# Patient Record
Sex: MALE | Race: WHITE | NOT HISPANIC OR LATINO | Employment: FULL TIME | URBAN - METROPOLITAN AREA
[De-identification: names, ages, dates, MRNs, and addresses within clinical notes are randomized per-mention and may not be internally consistent; named-entity substitution may affect disease eponyms.]

---

## 2019-05-08 ENCOUNTER — TELEPHONE (OUTPATIENT)
Dept: INTERNAL MEDICINE | Facility: CLINIC | Age: 44
End: 2019-05-08

## 2019-05-08 DIAGNOSIS — I10 ESSENTIAL HYPERTENSION, BENIGN: ICD-10-CM

## 2019-05-08 DIAGNOSIS — E78.2 MIXED HYPERLIPIDEMIA: Primary | ICD-10-CM

## 2019-05-08 DIAGNOSIS — Z72.53 HIGH RISK BISEXUAL BEHAVIOR: ICD-10-CM

## 2019-05-08 DIAGNOSIS — Z00.00 HEALTHCARE MAINTENANCE: ICD-10-CM

## 2019-05-08 RX ORDER — LISINOPRIL 10 MG/1
TABLET ORAL
Qty: 90 TABLET | Refills: 1 | Status: CANCELLED | OUTPATIENT
Start: 2019-05-08

## 2019-05-08 RX ORDER — ATORVASTATIN CALCIUM 20 MG/1
TABLET, FILM COATED ORAL
Qty: 90 TABLET | Refills: 1 | Status: CANCELLED | OUTPATIENT
Start: 2019-05-08

## 2019-05-13 RX ORDER — ATORVASTATIN CALCIUM 20 MG/1
20 TABLET, FILM COATED ORAL DAILY
Qty: 90 TABLET | Refills: 0 | Status: SHIPPED | OUTPATIENT
Start: 2019-05-13 | End: 2019-06-10 | Stop reason: SDUPTHER

## 2019-05-13 RX ORDER — LISINOPRIL 10 MG/1
10 TABLET ORAL DAILY
Qty: 90 TABLET | Refills: 0 | Status: SHIPPED | OUTPATIENT
Start: 2019-05-13 | End: 2019-06-10 | Stop reason: SDUPTHER

## 2019-06-10 ENCOUNTER — OFFICE VISIT (OUTPATIENT)
Dept: INTERNAL MEDICINE | Facility: CLINIC | Age: 44
End: 2019-06-10

## 2019-06-10 VITALS
BODY MASS INDEX: 31.75 KG/M2 | HEIGHT: 72 IN | RESPIRATION RATE: 20 BRPM | OXYGEN SATURATION: 98 % | TEMPERATURE: 99.1 F | DIASTOLIC BLOOD PRESSURE: 78 MMHG | SYSTOLIC BLOOD PRESSURE: 124 MMHG | WEIGHT: 234.4 LBS | HEART RATE: 88 BPM

## 2019-06-10 DIAGNOSIS — I10 ESSENTIAL HYPERTENSION: Primary | ICD-10-CM

## 2019-06-10 DIAGNOSIS — J45.909 ASTHMA, UNSPECIFIED ASTHMA SEVERITY, UNSPECIFIED WHETHER COMPLICATED, UNSPECIFIED WHETHER PERSISTENT: ICD-10-CM

## 2019-06-10 DIAGNOSIS — E78.5 HYPERLIPIDEMIA, UNSPECIFIED HYPERLIPIDEMIA TYPE: ICD-10-CM

## 2019-06-10 DIAGNOSIS — E11.9 TYPE 2 DIABETES MELLITUS WITHOUT COMPLICATION, WITHOUT LONG-TERM CURRENT USE OF INSULIN (HCC): ICD-10-CM

## 2019-06-10 LAB
ALBUMIN SERPL-MCNC: 4.7 G/DL (ref 3.5–5.2)
ALBUMIN/GLOB SERPL: 2 G/DL
ALP SERPL-CCNC: 60 U/L (ref 39–117)
ALT SERPL-CCNC: 41 U/L (ref 1–41)
AST SERPL-CCNC: 20 U/L (ref 1–40)
BILIRUB SERPL-MCNC: 0.7 MG/DL (ref 0.2–1.2)
BUN SERPL-MCNC: 12 MG/DL (ref 6–20)
BUN/CREAT SERPL: 13.6 (ref 7–25)
CALCIUM SERPL-MCNC: 9.7 MG/DL (ref 8.6–10.5)
CHLORIDE SERPL-SCNC: 102 MMOL/L (ref 98–107)
CHOLEST SERPL-MCNC: 132 MG/DL (ref 0–200)
CO2 SERPL-SCNC: 26.5 MMOL/L (ref 22–29)
CREAT SERPL-MCNC: 0.88 MG/DL (ref 0.76–1.27)
GLOBULIN SER CALC-MCNC: 2.3 GM/DL
GLUCOSE SERPL-MCNC: 177 MG/DL (ref 65–99)
HDLC SERPL-MCNC: 39 MG/DL (ref 40–60)
LDLC SERPL CALC-MCNC: 56 MG/DL (ref 0–100)
POTASSIUM SERPL-SCNC: 4.4 MMOL/L (ref 3.5–5.2)
PROT SERPL-MCNC: 7 G/DL (ref 6–8.5)
SODIUM SERPL-SCNC: 142 MMOL/L (ref 136–145)
TRIGL SERPL-MCNC: 184 MG/DL (ref 0–150)
VLDLC SERPL CALC-MCNC: 36.8 MG/DL

## 2019-06-10 PROCEDURE — 90715 TDAP VACCINE 7 YRS/> IM: CPT | Performed by: INTERNAL MEDICINE

## 2019-06-10 PROCEDURE — 90471 IMMUNIZATION ADMIN: CPT | Performed by: INTERNAL MEDICINE

## 2019-06-10 PROCEDURE — 99214 OFFICE O/P EST MOD 30 MIN: CPT | Performed by: INTERNAL MEDICINE

## 2019-06-10 RX ORDER — ALBUTEROL SULFATE 90 UG/1
2 AEROSOL, METERED RESPIRATORY (INHALATION) EVERY 4 HOURS PRN
COMMUNITY
End: 2019-06-10 | Stop reason: SDUPTHER

## 2019-06-10 RX ORDER — LISINOPRIL 10 MG/1
10 TABLET ORAL DAILY
Qty: 90 TABLET | Refills: 3 | Status: SHIPPED | OUTPATIENT
Start: 2019-06-10 | End: 2020-07-31

## 2019-06-10 RX ORDER — ATORVASTATIN CALCIUM 20 MG/1
20 TABLET, FILM COATED ORAL DAILY
Qty: 90 TABLET | Refills: 3 | Status: SHIPPED | OUTPATIENT
Start: 2019-06-10 | End: 2020-07-31

## 2019-06-10 RX ORDER — BUDESONIDE AND FORMOTEROL FUMARATE DIHYDRATE 80; 4.5 UG/1; UG/1
2 AEROSOL RESPIRATORY (INHALATION)
Qty: 3 INHALER | Refills: 3 | Status: SHIPPED | OUTPATIENT
Start: 2019-06-10 | End: 2020-09-21

## 2019-06-10 RX ORDER — DIAZEPAM 5 MG/1
5 TABLET ORAL 2 TIMES DAILY PRN
COMMUNITY
End: 2019-09-03 | Stop reason: SDUPTHER

## 2019-06-10 RX ORDER — ALBUTEROL SULFATE 90 UG/1
2 AEROSOL, METERED RESPIRATORY (INHALATION) EVERY 4 HOURS PRN
Qty: 3 INHALER | Refills: 6 | Status: SHIPPED | OUTPATIENT
Start: 2019-06-10 | End: 2020-06-22

## 2019-06-10 RX ORDER — TEMAZEPAM 15 MG/1
15 CAPSULE ORAL NIGHTLY PRN
COMMUNITY
End: 2019-09-03 | Stop reason: SDUPTHER

## 2019-06-10 RX ORDER — BUDESONIDE AND FORMOTEROL FUMARATE DIHYDRATE 80; 4.5 UG/1; UG/1
2 AEROSOL RESPIRATORY (INHALATION)
COMMUNITY
End: 2019-06-10 | Stop reason: SDUPTHER

## 2019-06-10 NOTE — PROGRESS NOTES
Subjective        Chief Complaint   Patient presents with   • Follow-up     6 month fup med eval refiulls   • Hypertension   • Med Refill   • Diabetes     PHQ-2 Depression Screening  Little interest or pleasure in doing things? 0   Feeling down, depressed, or hopeless? 0   PHQ-2 Total Score 0           Kaushal Jacobs is a 43 y.o. male who presents for    Patient Active Problem List   Diagnosis   • Hypertension   • Hyperlipidemia   • Asthma   • Diabetes mellitus (CMS/[facility])       History of Present Illness   He has not been checking his BP or sugars. He is not exercising. 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 He denies chest pain or dyspnea. He will wheeze a few times per week. He does not use his Symbicort regularly. He will use it only if his breathing is bad. He uses his rescue inhaler 2-3 times per week. He takes Temazepam infrequently for sleep.  Allergies   Allergen Reactions   • Penicillins        Current Outpatient Medications on File Prior to Visit   Medication Sig Dispense Refill   • diazePAM (VALIUM) 5 MG tablet Take 5 mg by mouth 2 (Two) Times a Day As Needed for Anxiety. AS NEEDED PRIOR TO FLIGHT     • temazepam (RESTORIL) 15 MG capsule Take 15 mg by mouth At Night As Needed for Sleep.     • [DISCONTINUED] albuterol sulfate  (90 Base) MCG/ACT inhaler Inhale 2 puffs Every 4 (Four) Hours As Needed for Wheezing.     • [DISCONTINUED] aspirin 81 MG tablet Take 1 tablet by mouth Daily. 30 tablet 0   • [DISCONTINUED] atorvastatin (LIPITOR) 20 MG tablet Take 1 tablet by mouth Daily. 90 tablet 0   • [DISCONTINUED] budesonide-formoterol (SYMBICORT) 80-4.5 MCG/ACT inhaler Inhale 2 puffs 2 (Two) Times a Day.     • [DISCONTINUED] lisinopril (PRINIVIL,ZESTRIL) 10 MG tablet Take 1 tablet by mouth Daily. 90 tablet 0     No current facility-administered medications on file prior to visit.        Past Medical History:   Diagnosis Date   • Asthma    • Carpal tunnel syndrome    • Diabetes mellitus (CMS/HCC)    • GERD  "(gastroesophageal reflux disease)    • Hyperlipidemia    • Hypertension    • Insomnia        Past Surgical History:   Procedure Laterality Date   • ENDOSCOPY     • HERNIA REPAIR     • KNEE SURGERY      ACL   • TONSILLECTOMY         Family History   Problem Relation Age of Onset   • Hyperlipidemia Mother    • Hypertension Mother    • Diabetes Mother    • Alzheimer's disease Mother    • COPD Father    • Heart disease Father    • Hyperlipidemia Father    • Hypertension Father        Social History     Socioeconomic History   • Marital status: Single     Spouse name: Not on file   • Number of children: Not on file   • Years of education: Not on file   • Highest education level: Not on file   Tobacco Use   • Smoking status: Never Smoker   • Smokeless tobacco: Never Used   Substance and Sexual Activity   • Alcohol use: Yes     Frequency: 4 or more times a week     Drinks per session: 1 or 2     Comment: 2 drinks daily   • Drug use: No   • Sexual activity: Yes     Partners: Male     Birth control/protection: Condom     Comment: he is sexually active with condoms but he does not have a stable partner           The following portions of the patient's history were reviewed and updated as appropriate: problem list, allergies, current medications, past medical history, past family history, past social history and past surgical history.    Review of Systems   Respiratory: Positive for wheezing. Negative for shortness of breath.    Cardiovascular: Negative for chest pain.       Immunization History   Administered Date(s) Administered   • Flu Vaccine Quad PF >18YRS 09/16/2016, 11/20/2018   • Hepatitis A 01/01/2011   • Hepatitis B 01/01/2011   • Tdap 01/01/2009   • Typhoid, Unspecified 01/01/2016       Objective   Vitals:    06/10/19 0841   BP: 124/78   Pulse: 88   Resp: 20   Temp: 99.1 °F (37.3 °C)   TempSrc: Oral   SpO2: 98%   Weight: 106 kg (234 lb 6.4 oz)   Height: 182.9 cm (72\")     Physical Exam   Constitutional: He appears " well-developed and well-nourished.   HENT:   Head: Normocephalic and atraumatic.   Cardiovascular: Normal rate, regular rhythm, S1 normal, S2 normal and normal heart sounds.   Pulmonary/Chest: Effort normal and breath sounds normal.   Neurological: He is alert.   Skin: Skin is warm.   Psychiatric: He has a normal mood and affect.   Vitals reviewed.      Procedures    Assessment/Plan   Kaushal was seen today for follow-up, hypertension, med refill and diabetes.    Diagnoses and all orders for this visit:    Essential hypertension  Comments:  BP is good    Hyperlipidemia, unspecified hyperlipidemia type  Comments:  Check LDL today    Asthma, unspecified asthma severity, unspecified whether complicated, unspecified whether persistent  Comments:  Discussed taking Symbicort regularly    Type 2 diabetes mellitus without complication, without long-term current use of insulin (CMS/[facility])  -     Hemoglobin A1c  -     Microalbumin / Creatinine Urine Ratio - Urine, Clean Catch  -     Comprehensive Metabolic Panel; Future  -     Hemoglobin A1c; Future    Other orders  -     Tdap Vaccine Greater Than or Equal To 8yo IM             Labs today including HIV that was already ordered. Tdap today. Rec use Symbicort twice per day. BP is good.Check a1c today. Encouraged exercise. Ran [employer] and signed controlled substance agreement for Temazepam.     Return in about 3 months (around 9/10/2019) for Annual physical.

## 2019-06-11 DIAGNOSIS — Z79.4 TYPE 2 DIABETES MELLITUS WITHOUT COMPLICATION, WITH LONG-TERM CURRENT USE OF INSULIN (HCC): Primary | ICD-10-CM

## 2019-06-11 DIAGNOSIS — E11.9 TYPE 2 DIABETES MELLITUS WITHOUT COMPLICATION, WITH LONG-TERM CURRENT USE OF INSULIN (HCC): Primary | ICD-10-CM

## 2019-06-11 LAB
ALBUMIN/CREAT UR: 7.6 MG/G CREAT (ref 0–30)
CREAT UR-MCNC: 330.9 MG/DL
HBA1C MFR BLD: 7.4 % (ref 4.8–5.6)
HIV 1+2 AB+HIV1 P24 AG SERPL QL IA: NON REACTIVE
MICROALBUMIN UR-MCNC: 25.1 UG/ML
RPR SER QL: NORMAL

## 2019-07-03 ENCOUNTER — HOSPITAL ENCOUNTER (OUTPATIENT)
Dept: DIABETES SERVICES | Facility: HOSPITAL | Age: 44
Setting detail: RECURRING SERIES
Discharge: HOME | End: 2019-07-03

## 2019-07-03 PROCEDURE — G0109 DIAB MANAGE TRN IND/GROUP: HCPCS

## 2019-07-10 ENCOUNTER — HOSPITAL ENCOUNTER (OUTPATIENT)
Dept: DIABETES SERVICES | Facility: HOSPITAL | Age: 44
Setting detail: RECURRING SERIES
Discharge: HOME | End: 2019-07-10

## 2019-07-10 PROCEDURE — G0109 DIAB MANAGE TRN IND/GROUP: HCPCS

## 2019-07-17 ENCOUNTER — HOSPITAL ENCOUNTER (OUTPATIENT)
Dept: DIABETES SERVICES | Facility: HOSPITAL | Age: 44
Setting detail: RECURRING SERIES
Discharge: HOME | End: 2019-07-17

## 2019-07-17 PROCEDURE — G0109 DIAB MANAGE TRN IND/GROUP: HCPCS | Performed by: DIETITIAN, REGISTERED

## 2019-09-03 ENCOUNTER — RESULTS ENCOUNTER (OUTPATIENT)
Dept: INTERNAL MEDICINE | Facility: CLINIC | Age: 44
End: 2019-09-03

## 2019-09-03 DIAGNOSIS — E11.9 TYPE 2 DIABETES MELLITUS WITHOUT COMPLICATION, WITHOUT LONG-TERM CURRENT USE OF INSULIN (HCC): ICD-10-CM

## 2019-09-03 RX ORDER — DIAZEPAM 5 MG/1
5 TABLET ORAL 2 TIMES DAILY PRN
Qty: 6 TABLET | Refills: 0 | Status: SHIPPED | OUTPATIENT
Start: 2019-09-03 | End: 2021-09-15

## 2019-09-03 RX ORDER — TEMAZEPAM 15 MG/1
15 CAPSULE ORAL NIGHTLY PRN
Qty: 15 CAPSULE | Refills: 0 | Status: SHIPPED | OUTPATIENT
Start: 2019-09-03 | End: 2021-07-16

## 2019-09-11 LAB
ALBUMIN SERPL-MCNC: 4.7 G/DL (ref 3.5–5.2)
ALBUMIN/GLOB SERPL: 2 G/DL
ALP SERPL-CCNC: 47 U/L (ref 39–117)
ALT SERPL-CCNC: 25 U/L (ref 1–41)
AST SERPL-CCNC: 19 U/L (ref 1–40)
BILIRUB SERPL-MCNC: 0.5 MG/DL (ref 0.2–1.2)
BUN SERPL-MCNC: 12 MG/DL (ref 6–20)
BUN/CREAT SERPL: 14.5 (ref 7–25)
CALCIUM SERPL-MCNC: 9.4 MG/DL (ref 8.6–10.5)
CHLORIDE SERPL-SCNC: 100 MMOL/L (ref 98–107)
CO2 SERPL-SCNC: 24.1 MMOL/L (ref 22–29)
CREAT SERPL-MCNC: 0.83 MG/DL (ref 0.76–1.27)
GLOBULIN SER CALC-MCNC: 2.3 GM/DL
GLUCOSE SERPL-MCNC: 124 MG/DL (ref 65–99)
HBA1C MFR BLD: 6 % (ref 4.8–5.6)
POTASSIUM SERPL-SCNC: 4.8 MMOL/L (ref 3.5–5.2)
PROT SERPL-MCNC: 7 G/DL (ref 6–8.5)
SODIUM SERPL-SCNC: 140 MMOL/L (ref 136–145)

## 2019-09-24 ENCOUNTER — OFFICE VISIT (OUTPATIENT)
Dept: INTERNAL MEDICINE | Facility: CLINIC | Age: 44
End: 2019-09-24

## 2019-09-24 VITALS
SYSTOLIC BLOOD PRESSURE: 124 MMHG | WEIGHT: 218.4 LBS | TEMPERATURE: 98.6 F | HEIGHT: 72 IN | DIASTOLIC BLOOD PRESSURE: 76 MMHG | BODY MASS INDEX: 29.58 KG/M2 | HEART RATE: 93 BPM | OXYGEN SATURATION: 94 %

## 2019-09-24 DIAGNOSIS — J45.909 ASTHMA, UNSPECIFIED ASTHMA SEVERITY, UNSPECIFIED WHETHER COMPLICATED, UNSPECIFIED WHETHER PERSISTENT: ICD-10-CM

## 2019-09-24 DIAGNOSIS — I10 ESSENTIAL HYPERTENSION: Primary | ICD-10-CM

## 2019-09-24 DIAGNOSIS — E11.9 TYPE 2 DIABETES MELLITUS WITHOUT COMPLICATION, WITHOUT LONG-TERM CURRENT USE OF INSULIN (HCC): ICD-10-CM

## 2019-09-24 DIAGNOSIS — Z00.00 WELLNESS EXAMINATION: ICD-10-CM

## 2019-09-24 DIAGNOSIS — J06.9 ACUTE URI: ICD-10-CM

## 2019-09-24 PROCEDURE — 99214 OFFICE O/P EST MOD 30 MIN: CPT | Performed by: INTERNAL MEDICINE

## 2019-09-24 RX ORDER — METHYLPREDNISOLONE 4 MG/1
TABLET ORAL
Qty: 21 EACH | Refills: 0 | Status: SHIPPED | OUTPATIENT
Start: 2019-09-24 | End: 2020-03-24

## 2019-09-24 RX ORDER — AZITHROMYCIN 250 MG/1
TABLET, FILM COATED ORAL
Qty: 6 TABLET | Refills: 0 | Status: SHIPPED | OUTPATIENT
Start: 2019-09-24 | End: 2020-03-24

## 2019-09-24 NOTE — PROGRESS NOTES
Subjective        Chief Complaint   Patient presents with   • Follow-up     3 month fup review labs    • Hypertension   • Hyperlipidemia   • Diabetes           Kaushal Jacobs is a 43 y.o. male who presents for    Patient Active Problem List   Diagnosis   • Hypertension   • Hyperlipidemia   • Asthma   • Diabetes mellitus (CMS/[facility])       History of Present Illness     He denies chest pain or dyspnea. He has a runny nose with congestion and watery eyes. He has been coughing up yellow to clear since Friday.  He only uses Valium and Temazepam for traveling. He does not check his BP or sugars. He does not exercise. He does not use Symbicort regularly; before he was sick it had been weeks since he used Albuterol. He called his ortho for pain in his left foot and he has been using Ibuprofen. He has had some low back pain after getting a massage. It is getting better. He has lost weight with low carb diet.  Allergies   Allergen Reactions   • Penicillins        Current Outpatient Medications on File Prior to Visit   Medication Sig Dispense Refill   • albuterol sulfate  (90 Base) MCG/ACT inhaler Inhale 2 puffs Every 4 (Four) Hours As Needed for Wheezing. 3 inhaler 6   • atorvastatin (LIPITOR) 20 MG tablet Take 1 tablet by mouth Daily. 90 tablet 3   • budesonide-formoterol (SYMBICORT) 80-4.5 MCG/ACT inhaler Inhale 2 puffs 2 (Two) Times a Day. 3 inhaler 3   • lisinopril (PRINIVIL,ZESTRIL) 10 MG tablet Take 1 tablet by mouth Daily. 90 tablet 3   • metFORMIN (GLUCOPHAGE) 500 MG tablet Take 1 tablet by mouth 2 (Two) Times a Day With Meals. 60 tablet 4   • diazePAM (VALIUM) 5 MG tablet Take 1 tablet by mouth 2 (Two) Times a Day As Needed for Anxiety. AS NEEDED PRIOR TO FLIGHT 6 tablet 0   • temazepam (RESTORIL) 15 MG capsule Take 1 capsule by mouth At Night As Needed for Sleep. 15 capsule 0     No current facility-administered medications on file prior to visit.        Past Medical History:   Diagnosis Date   • Asthma    •  Carpal tunnel syndrome    • Diabetes mellitus (CMS/HCC)    • GERD (gastroesophageal reflux disease)    • Hyperlipidemia    • Hypertension    • Insomnia        Past Surgical History:   Procedure Laterality Date   • ENDOSCOPY     • KNEE SURGERY Right 2000    ACL   • TONSILLECTOMY     • UMBILICAL HERNIA REPAIR         Family History   Problem Relation Age of Onset   • Hyperlipidemia Mother    • Hypertension Mother    • Diabetes Mother    • Alzheimer's disease Mother    • COPD Father    • Heart disease Father    • Hyperlipidemia Father    • Hypertension Father        Social History     Socioeconomic History   • Marital status: Single     Spouse name: Not on file   • Number of children: Not on file   • Years of education: Not on file   • Highest education level: Not on file   Tobacco Use   • Smoking status: Never Smoker   • Smokeless tobacco: Never Used   Substance and Sexual Activity   • Alcohol use: Yes     Frequency: 4 or more times a week     Drinks per session: 1 or 2     Comment: 2 drinks daily   • Drug use: No   • Sexual activity: Yes     Partners: Male     Birth control/protection: Condom     Comment: he is sexually active with condoms but he does not have a stable partner           The following portions of the patient's history were reviewed and updated as appropriate: problem list, allergies, current medications, past medical history, past family history, past social history and past surgical history.    Review of Systems   Respiratory: Positive for wheezing. Negative for shortness of breath.    Cardiovascular: Negative for chest pain.       Immunization History   Administered Date(s) Administered   • Flu Vaccine Quad PF >18YRS 09/16/2016, 11/20/2018   • Hepatitis A 01/01/2011   • Hepatitis B 01/01/2011   • Tdap 01/01/2009, 06/10/2019   • Typhoid, Unspecified 01/01/2016       Objective   Vitals:    09/24/19 0849   BP: 124/76   Pulse: 93   Temp: 98.6 °F (37 °C)   TempSrc: Oral   SpO2: 94%   Weight: 99.1 kg (218  "lb 6.4 oz)   Height: 182.9 cm (72\")     Physical Exam   Constitutional: He appears well-developed and well-nourished.   HENT:   Head: Normocephalic and atraumatic.   Mouth/Throat: Oropharynx is clear and moist.   Cardiovascular: Normal rate, regular rhythm, S1 normal, S2 normal and normal heart sounds.   Pulmonary/Chest: Effort normal. He has wheezes.   Neurological: He is alert.   Skin: Skin is warm.   Psychiatric: He has a normal mood and affect.   Vitals reviewed.      Procedures    Assessment/Plan   Kaushal was seen today for follow-up, hypertension, hyperlipidemia and diabetes.    Diagnoses and all orders for this visit:    Essential hypertension    Type 2 diabetes mellitus without complication, without long-term current use of insulin (CMS/[facility])  -     Comprehensive Metabolic Panel; Future  -     Hemoglobin A1c; Future  -     Lipid Panel With / Chol / HDL Ratio; Future  -     Microalbumin / Creatinine Urine Ratio - Urine, Clean Catch; Future    Asthma, unspecified asthma severity, unspecified whether complicated, unspecified whether persistent  -     methylPREDNISolone (MEDROL, BILL,) 4 MG tablet; Take as directed on package instructions.    Acute URI  -     azithromycin (ZITHROMAX) 250 MG tablet; Take 2 tablets the first day, then 1 tablet daily for 4 days.    Wellness examination  -     RPR, Rfx Qn RPR / Confirm TP; Future  -     HIV-1/O/2 Ag/Ab w Reflex; Future             Labs reviewed. BP and a1c are excellent. He is losing wt on purpose. Treat asthma exacerbation.    Return in about 6 months (around 3/24/2020) for Annual physical.  "

## 2020-03-12 DIAGNOSIS — Z00.00 WELLNESS EXAMINATION: ICD-10-CM

## 2020-03-12 DIAGNOSIS — E11.9 TYPE 2 DIABETES MELLITUS WITHOUT COMPLICATION, WITHOUT LONG-TERM CURRENT USE OF INSULIN (HCC): ICD-10-CM

## 2020-03-17 LAB
ALBUMIN SERPL-MCNC: 4.6 G/DL (ref 4–5)
ALBUMIN/CREAT UR: 11 MG/G CREAT (ref 0–29)
ALBUMIN/GLOB SERPL: 2.1 {RATIO} (ref 1.2–2.2)
ALP SERPL-CCNC: 55 IU/L (ref 39–117)
ALT SERPL-CCNC: 27 IU/L (ref 0–44)
AST SERPL-CCNC: 19 IU/L (ref 0–40)
BILIRUB SERPL-MCNC: 0.5 MG/DL (ref 0–1.2)
BUN SERPL-MCNC: 13 MG/DL (ref 6–24)
BUN/CREAT SERPL: 14 (ref 9–20)
CALCIUM SERPL-MCNC: 9.2 MG/DL (ref 8.7–10.2)
CHLORIDE SERPL-SCNC: 102 MMOL/L (ref 96–106)
CHOLEST SERPL-MCNC: 122 MG/DL (ref 100–199)
CHOLEST/HDLC SERPL: 3.3 RATIO (ref 0–5)
CO2 SERPL-SCNC: 21 MMOL/L (ref 20–29)
CREAT SERPL-MCNC: 0.91 MG/DL (ref 0.76–1.27)
CREAT UR-MCNC: 245.1 MG/DL
GLOBULIN SER CALC-MCNC: 2.2 G/DL (ref 1.5–4.5)
GLUCOSE SERPL-MCNC: 145 MG/DL (ref 65–99)
HBA1C MFR BLD: 6.8 % (ref 4.8–5.6)
HDLC SERPL-MCNC: 37 MG/DL
HIV 1+2 AB+HIV1 P24 AG SERPL QL IA: NON REACTIVE
LDLC SERPL CALC-MCNC: 59 MG/DL (ref 0–99)
MICROALBUMIN UR-MCNC: 26.8 UG/ML
POTASSIUM SERPL-SCNC: 4.8 MMOL/L (ref 3.5–5.2)
PROT SERPL-MCNC: 6.8 G/DL (ref 6–8.5)
RPR SER QL: NON REACTIVE
SODIUM SERPL-SCNC: 139 MMOL/L (ref 134–144)
TRIGL SERPL-MCNC: 128 MG/DL (ref 0–149)
VLDLC SERPL CALC-MCNC: 26 MG/DL (ref 5–40)

## 2020-03-24 ENCOUNTER — OFFICE VISIT (OUTPATIENT)
Dept: INTERNAL MEDICINE | Facility: CLINIC | Age: 45
End: 2020-03-24

## 2020-03-24 VITALS
BODY MASS INDEX: 30.88 KG/M2 | HEIGHT: 72 IN | SYSTOLIC BLOOD PRESSURE: 120 MMHG | WEIGHT: 228 LBS | DIASTOLIC BLOOD PRESSURE: 82 MMHG

## 2020-03-24 DIAGNOSIS — E11.9 TYPE 2 DIABETES MELLITUS WITHOUT COMPLICATION, WITHOUT LONG-TERM CURRENT USE OF INSULIN (HCC): Primary | ICD-10-CM

## 2020-03-24 DIAGNOSIS — J45.20 MILD INTERMITTENT ASTHMA WITHOUT COMPLICATION: ICD-10-CM

## 2020-03-24 DIAGNOSIS — I10 ESSENTIAL HYPERTENSION: ICD-10-CM

## 2020-03-24 DIAGNOSIS — E78.49 OTHER HYPERLIPIDEMIA: ICD-10-CM

## 2020-03-24 PROCEDURE — 99214 OFFICE O/P EST MOD 30 MIN: CPT | Performed by: INTERNAL MEDICINE

## 2020-03-24 NOTE — PROGRESS NOTES
Subjective        Chief Complaint   Patient presents with   • Hypertension     follow up           Kaushal Jacobs is a 44 y.o. male who presents for    Patient Active Problem List   Diagnosis   • Hypertension   • Hyperlipidemia   • Asthma   • Type 2 diabetes mellitus without complication, without long-term current use of insulin (CMS/[facility])       History of Present Illness     He has not been checking his BP or sugars. He is not exercising. He got a cold about a month ago and he needed his albuterol. He is using his albuterol three times per week. He just got back on Symbicort. He only wheezes after he gets a cold twice per year. He denies chest or dyspnea.  Allergies   Allergen Reactions   • Penicillins        Current Outpatient Medications on File Prior to Visit   Medication Sig Dispense Refill   • albuterol sulfate  (90 Base) MCG/ACT inhaler Inhale 2 puffs Every 4 (Four) Hours As Needed for Wheezing. 3 inhaler 6   • atorvastatin (LIPITOR) 20 MG tablet Take 1 tablet by mouth Daily. 90 tablet 3   • budesonide-formoterol (SYMBICORT) 80-4.5 MCG/ACT inhaler Inhale 2 puffs 2 (Two) Times a Day. 3 inhaler 3   • diazePAM (VALIUM) 5 MG tablet Take 1 tablet by mouth 2 (Two) Times a Day As Needed for Anxiety. AS NEEDED PRIOR TO FLIGHT 6 tablet 0   • lisinopril (PRINIVIL,ZESTRIL) 10 MG tablet Take 1 tablet by mouth Daily. 90 tablet 3   • metFORMIN (GLUCOPHAGE) 500 MG tablet TAKE 1 TABLET BY MOUTH TWICE A DAY WITH MEALS 180 tablet 1   • temazepam (RESTORIL) 15 MG capsule Take 1 capsule by mouth At Night As Needed for Sleep. 15 capsule 0   • [DISCONTINUED] azithromycin (ZITHROMAX) 250 MG tablet Take 2 tablets the first day, then 1 tablet daily for 4 days. 6 tablet 0   • [DISCONTINUED] methylPREDNISolone (MEDROL, BILL,) 4 MG tablet Take as directed on package instructions. 21 each 0     No current facility-administered medications on file prior to visit.        Past Medical History:   Diagnosis Date   • Asthma    • Carpal  "tunnel syndrome    • Diabetes mellitus (CMS/HCC)    • GERD (gastroesophageal reflux disease)    • Hyperlipidemia    • Hypertension    • Insomnia        Past Surgical History:   Procedure Laterality Date   • ENDOSCOPY     • KNEE SURGERY Right 2000    ACL   • TONSILLECTOMY     • UMBILICAL HERNIA REPAIR         Family History   Problem Relation Age of Onset   • Hyperlipidemia Mother    • Hypertension Mother    • Diabetes Mother    • Alzheimer's disease Mother    • COPD Father    • Heart disease Father    • Hyperlipidemia Father    • Hypertension Father        Social History     Socioeconomic History   • Marital status: Single     Spouse name: Not on file   • Number of children: Not on file   • Years of education: Not on file   • Highest education level: Not on file   Tobacco Use   • Smoking status: Never Smoker   • Smokeless tobacco: Never Used   Substance and Sexual Activity   • Alcohol use: Yes     Frequency: 4 or more times a week     Drinks per session: 1 or 2     Comment: 2 drinks daily   • Drug use: No   • Sexual activity: Yes     Partners: Male     Birth control/protection: Condom     Comment: he is sexually active with condoms but he does not have a stable partner           The following portions of the patient's history were reviewed and updated as appropriate: problem list, allergies, current medications, past medical history, past family history, past social history and past surgical history.    Review of Systems   Respiratory: Negative for shortness of breath.    Cardiovascular: Negative for chest pain.       Immunization History   Administered Date(s) Administered   • Flu Vaccine Quad PF >18YRS 09/16/2016, 11/20/2018   • Hepatitis A 01/01/2011   • Hepatitis B 01/01/2011   • Tdap 01/01/2009, 06/10/2019   • Typhoid, Unspecified 01/01/2016       Objective   Vitals:    03/24/20 0828   BP: 120/82   Weight: 103 kg (228 lb)   Height: 182.9 cm (72\")     Body mass index is 30.92 kg/m².  Physical Exam "   Constitutional: He appears well-developed and well-nourished.   HENT:   Head: Normocephalic and atraumatic.   Cardiovascular: Normal rate, regular rhythm, S1 normal, S2 normal and normal heart sounds.   Pulmonary/Chest: Effort normal and breath sounds normal.    Kaushal had a diabetic foot exam performed today.   During the foot exam he had a monofilament test performed.  Vascular Status -  His right foot exhibits no edema. His left foot exhibits no edema.  Skin Integrity  -  His right foot skin is intact.His left foot skin is intact..  Neurological: He is alert.   Skin: Skin is warm.   Psychiatric: He has a normal mood and affect.   Vitals reviewed.      Procedures    Assessment/Plan   Kaushal was seen today for hypertension.    Diagnoses and all orders for this visit:    Type 2 diabetes mellitus without complication, without long-term current use of insulin (CMS/[facility])  Comments:  A1c at goal. Recc exercise 150 minutes per week  Orders:  -     Hemoglobin A1c; Future    Essential hypertension  Comments:  BP is good  Orders:  -     Comprehensive Metabolic Panel; Future    Mild intermittent asthma without complication  Comments:  Reviewed PFTs from 2017. He will only start Symbicort the fews months of the year that he wheezes    Other hyperlipidemia  Comments:  LDL is at goal             Labs reviewed.     Return in about 6 months (around 9/24/2020) for Annual physical.

## 2020-06-22 DIAGNOSIS — J45.20 MILD INTERMITTENT ASTHMA WITHOUT COMPLICATION: Primary | ICD-10-CM

## 2020-06-22 RX ORDER — ALBUTEROL SULFATE 90 UG/1
AEROSOL, METERED RESPIRATORY (INHALATION)
Qty: 20.1 INHALER | Refills: 0 | Status: SHIPPED | OUTPATIENT
Start: 2020-06-22 | End: 2022-09-02

## 2020-07-31 RX ORDER — LISINOPRIL 10 MG/1
TABLET ORAL
Qty: 90 TABLET | Refills: 3 | Status: SHIPPED | OUTPATIENT
Start: 2020-07-31 | End: 2021-04-16

## 2020-07-31 RX ORDER — ATORVASTATIN CALCIUM 20 MG/1
TABLET, FILM COATED ORAL
Qty: 90 TABLET | Refills: 3 | Status: SHIPPED | OUTPATIENT
Start: 2020-07-31 | End: 2021-07-21 | Stop reason: SDUPTHER

## 2020-09-15 DIAGNOSIS — I10 ESSENTIAL HYPERTENSION: ICD-10-CM

## 2020-09-15 DIAGNOSIS — E11.9 TYPE 2 DIABETES MELLITUS WITHOUT COMPLICATION, WITHOUT LONG-TERM CURRENT USE OF INSULIN (HCC): ICD-10-CM

## 2020-09-17 LAB
ALBUMIN SERPL-MCNC: 4.5 G/DL (ref 3.5–5.2)
ALBUMIN/GLOB SERPL: 2.4 G/DL
ALP SERPL-CCNC: 53 U/L (ref 39–117)
ALT SERPL-CCNC: 53 U/L (ref 1–41)
AST SERPL-CCNC: 30 U/L (ref 1–40)
BILIRUB SERPL-MCNC: 0.5 MG/DL (ref 0–1.2)
BUN SERPL-MCNC: 14 MG/DL (ref 6–20)
BUN/CREAT SERPL: 16.7 (ref 7–25)
CALCIUM SERPL-MCNC: 8.8 MG/DL (ref 8.6–10.5)
CHLORIDE SERPL-SCNC: 104 MMOL/L (ref 98–107)
CO2 SERPL-SCNC: 24.3 MMOL/L (ref 22–29)
CREAT SERPL-MCNC: 0.84 MG/DL (ref 0.76–1.27)
GLOBULIN SER CALC-MCNC: 1.9 GM/DL
GLUCOSE SERPL-MCNC: 160 MG/DL (ref 65–99)
HBA1C MFR BLD: 7.2 % (ref 4.8–5.6)
POTASSIUM SERPL-SCNC: 4.5 MMOL/L (ref 3.5–5.2)
PROT SERPL-MCNC: 6.4 G/DL (ref 6–8.5)
SODIUM SERPL-SCNC: 141 MMOL/L (ref 136–145)

## 2020-09-21 RX ORDER — DILTIAZEM HYDROCHLORIDE 60 MG/1
TABLET, FILM COATED ORAL
Qty: 30.6 INHALER | Refills: 0 | Status: SHIPPED | OUTPATIENT
Start: 2020-09-21 | End: 2020-12-17

## 2020-09-24 ENCOUNTER — OFFICE VISIT (OUTPATIENT)
Dept: INTERNAL MEDICINE | Facility: CLINIC | Age: 45
End: 2020-09-24

## 2020-09-24 VITALS
BODY MASS INDEX: 31.18 KG/M2 | WEIGHT: 230.2 LBS | HEIGHT: 72 IN | DIASTOLIC BLOOD PRESSURE: 76 MMHG | SYSTOLIC BLOOD PRESSURE: 132 MMHG | TEMPERATURE: 98 F

## 2020-09-24 DIAGNOSIS — E11.9 TYPE 2 DIABETES MELLITUS WITHOUT COMPLICATION, WITHOUT LONG-TERM CURRENT USE OF INSULIN (HCC): ICD-10-CM

## 2020-09-24 DIAGNOSIS — J45.20 MILD INTERMITTENT ASTHMA WITHOUT COMPLICATION: ICD-10-CM

## 2020-09-24 DIAGNOSIS — Z11.59 NEED FOR HEPATITIS C SCREENING TEST: ICD-10-CM

## 2020-09-24 DIAGNOSIS — Z00.00 WELLNESS EXAMINATION: Primary | ICD-10-CM

## 2020-09-24 DIAGNOSIS — R10.13 DYSPEPSIA: ICD-10-CM

## 2020-09-24 DIAGNOSIS — I10 ESSENTIAL HYPERTENSION: ICD-10-CM

## 2020-09-24 PROBLEM — E78.49 OTHER HYPERLIPIDEMIA: Status: ACTIVE | Noted: 2019-06-10

## 2020-09-24 PROCEDURE — 90732 PPSV23 VACC 2 YRS+ SUBQ/IM: CPT | Performed by: INTERNAL MEDICINE

## 2020-09-24 PROCEDURE — 99396 PREV VISIT EST AGE 40-64: CPT | Performed by: INTERNAL MEDICINE

## 2020-09-24 PROCEDURE — 90686 IIV4 VACC NO PRSV 0.5 ML IM: CPT | Performed by: INTERNAL MEDICINE

## 2020-09-24 PROCEDURE — 90472 IMMUNIZATION ADMIN EACH ADD: CPT | Performed by: INTERNAL MEDICINE

## 2020-09-24 PROCEDURE — 90471 IMMUNIZATION ADMIN: CPT | Performed by: INTERNAL MEDICINE

## 2020-09-24 RX ORDER — OMEPRAZOLE 20 MG/1
20 CAPSULE, DELAYED RELEASE ORAL DAILY
Qty: 30 CAPSULE | Refills: 3 | Status: SHIPPED | OUTPATIENT
Start: 2020-09-24 | End: 2021-01-25

## 2020-09-24 NOTE — PROGRESS NOTES
Subjective        Chief Complaint   Patient presents with   • Annual Exam           Kaushal Jacobs is a 44 y.o. male who presents for    Patient Active Problem List   Diagnosis   • Essential hypertension   • Other hyperlipidemia   • Asthma   • Type 2 diabetes mellitus without complication, without long-term current use of insulin (CMS/[facility])   • Wellness examination   • Dyspepsia       History of Present Illness     He has stomach acid when he gets up between 4-6 am. He feels an acidic sensation in his stomach. He does not have reflux regularly.He was on medication in the past for reflux. He is not wheezing. He has not used his rescue inhaler. He uses his symbicort regularly. He does not check his sugars or BP. He does use condoms.  Allergies   Allergen Reactions   • Penicillins        Current Outpatient Medications on File Prior to Visit   Medication Sig Dispense Refill   • ALBUTEROL SULFATE  (90 Base) MCG/ACT inhaler INHALE 2 PUFFS BY MOUTH EVERY 4 HOURS AS NEEDED 20.1 inhaler 0   • atorvastatin (LIPITOR) 20 MG tablet TAKE 1 TABLET BY MOUTH EVERY DAY 90 tablet 3   • lisinopril (PRINIVIL,ZESTRIL) 10 MG tablet TAKE 1 TABLET BY MOUTH EVERY DAY 90 tablet 3   • Symbicort 80-4.5 MCG/ACT inhaler INHALE 2 PUFFS BY MOUTH TWICE A DAY 30.6 inhaler 0   • [DISCONTINUED] metFORMIN (GLUCOPHAGE) 500 MG tablet TAKE 1 TABLET BY MOUTH TWICE A DAY WITH MEALS 180 tablet 0   • diazePAM (VALIUM) 5 MG tablet Take 1 tablet by mouth 2 (Two) Times a Day As Needed for Anxiety. AS NEEDED PRIOR TO FLIGHT 6 tablet 0   • temazepam (RESTORIL) 15 MG capsule Take 1 capsule by mouth At Night As Needed for Sleep. 15 capsule 0     No current facility-administered medications on file prior to visit.        Past Medical History:   Diagnosis Date   • Asthma    • Carpal tunnel syndrome    • GERD (gastroesophageal reflux disease)    • Hyperlipidemia    • Insomnia        Past Surgical History:   Procedure Laterality Date   • ENDOSCOPY     • KNEE SURGERY  Right 2000    ACL   • TONSILLECTOMY     • UMBILICAL HERNIA REPAIR         Family History   Problem Relation Age of Onset   • Hyperlipidemia Mother    • Hypertension Mother    • Diabetes Mother    • Alzheimer's disease Mother    • COPD Father    • Heart disease Father    • Hyperlipidemia Father    • Hypertension Father        Social History     Socioeconomic History   • Marital status: Single     Spouse name: Not on file   • Number of children: Not on file   • Years of education: Not on file   • Highest education level: Not on file   Tobacco Use   • Smoking status: Never Smoker   • Smokeless tobacco: Never Used   Substance and Sexual Activity   • Alcohol use: Yes     Frequency: 4 or more times a week     Drinks per session: 1 or 2     Comment: 2 drinks daily   • Drug use: No   • Sexual activity: Yes     Partners: Male     Birth control/protection: Condom     Comment: he is sexually active with condoms but he does not have a stable partner           The following portions of the patient's history were reviewed and updated as appropriate: problem list, allergies, current medications, past medical history, past family history, past social history and past surgical history.    Review of Systems   Constitutional: Negative for chills, fever and unexpected weight loss.   HENT: Negative for postnasal drip and sore throat.    Eyes: Negative for blurred vision.   Respiratory: Negative for cough, shortness of breath and wheezing.    Cardiovascular: Negative for chest pain and leg swelling.   Gastrointestinal: Positive for indigestion. Negative for abdominal pain, blood in stool, nausea, vomiting and GERD.   Endocrine: Negative for polyuria.   Genitourinary: Negative for dysuria, frequency and hematuria.   Musculoskeletal: Negative for gait problem.   Skin: Negative for rash.   Allergic/Immunologic: Negative for immunocompromised state.   Neurological: Negative for weakness.   Hematological: Does not bruise/bleed easily.  "  Psychiatric/Behavioral: Negative for depressed mood. The patient is not nervous/anxious.        Immunization History   Administered Date(s) Administered   • Flu Vaccine Quad PF >18YRS 09/16/2016, 11/20/2018   • Flulaval/Fluarix Quad 09/24/2020   • Hepatitis A 01/01/2011   • Hepatitis B 01/01/2011   • Pneumococcal Polysaccharide (PPSV23) 09/24/2020   • Tdap 01/01/2009, 06/10/2019   • Typhoid, Unspecified 01/01/2016       Objective   Vitals:    09/24/20 1610   BP: 132/76   Temp: 98 °F (36.7 °C)   Weight: 104 kg (230 lb 3.2 oz)   Height: 182.9 cm (72\")     Body mass index is 31.22 kg/m².  Physical Exam  Vitals signs reviewed.   Constitutional:       Appearance: He is well-developed.   HENT:      Head: Normocephalic and atraumatic.      Mouth/Throat:      Mouth: Mucous membranes are moist.      Pharynx: Oropharynx is clear.   Eyes:      Extraocular Movements: Extraocular movements intact.      Conjunctiva/sclera: Conjunctivae normal.      Pupils: Pupils are equal, round, and reactive to light.   Neck:      Musculoskeletal: Neck supple.      Thyroid: No thyromegaly.      Trachea: Trachea normal.   Cardiovascular:      Rate and Rhythm: Normal rate and regular rhythm.      Heart sounds: Normal heart sounds. No murmur.   Pulmonary:      Effort: Pulmonary effort is normal.      Breath sounds: Normal breath sounds.   Abdominal:      General: There is no distension.      Palpations: Abdomen is soft. There is no mass.      Tenderness: There is no abdominal tenderness. There is no rebound.   Genitourinary:     Penis: Normal.       Scrotum/Testes: Normal.   Feet:      Right foot:      Protective Sensation: 5 sites tested. 5 sites sensed.      Skin integrity: Skin integrity normal.      Left foot:      Protective Sensation: 5 sites tested. 5 sites sensed.      Skin integrity: Skin integrity normal.   Lymphadenopathy:      Cervical: No cervical adenopathy.   Skin:     General: Skin is warm.   Neurological:      Mental Status: He " is alert.   Psychiatric:         Behavior: Behavior normal.         Procedures    Assessment/Plan   Kaushal was seen today for annual exam.    Diagnoses and all orders for this visit:    Wellness examination    Essential hypertension    Type 2 diabetes mellitus without complication, without long-term current use of insulin (CMS/[facility])  -     metFORMIN (GLUCOPHAGE) 1000 MG tablet; Take 1 tablet by mouth 2 (two) times a day.  -     Comprehensive Metabolic Panel; Future  -     Hemoglobin A1c; Future    Mild intermittent asthma without complication    Dyspepsia  -     omeprazole (PrilOSEC) 20 MG capsule; Take 1 capsule by mouth Daily.    Need for hepatitis C screening test  -     Hepatitis C Antibody; Future    Other orders  -     Fluarix/Fluzone/Afluria Quad>6 Months  -     Pneumococcal Polysaccharide Vaccine 23-Valent Greater Than or Equal To 1yo Subcutaneous / IM               Reviewed labs. Pneumovax and flu today.  Increase Metformin and start Prilosec. Asthma is stable. Repeat LFTs in 3 months. Discussed wt loss and exercising 150 minutes.  Return in about 3 months (around 12/24/2020).

## 2020-12-15 DIAGNOSIS — E11.9 TYPE 2 DIABETES MELLITUS WITHOUT COMPLICATION, WITHOUT LONG-TERM CURRENT USE OF INSULIN (HCC): ICD-10-CM

## 2020-12-17 DIAGNOSIS — J45.20 MILD INTERMITTENT ASTHMA WITHOUT COMPLICATION: Primary | ICD-10-CM

## 2020-12-17 RX ORDER — DILTIAZEM HYDROCHLORIDE 60 MG/1
TABLET, FILM COATED ORAL
Qty: 30.6 INHALER | Refills: 0 | Status: SHIPPED | OUTPATIENT
Start: 2020-12-17 | End: 2021-11-10 | Stop reason: SDUPTHER

## 2020-12-23 ENCOUNTER — RESULTS ENCOUNTER (OUTPATIENT)
Dept: INTERNAL MEDICINE | Facility: CLINIC | Age: 45
End: 2020-12-23

## 2020-12-23 DIAGNOSIS — Z11.59 NEED FOR HEPATITIS C SCREENING TEST: ICD-10-CM

## 2020-12-23 DIAGNOSIS — E11.9 TYPE 2 DIABETES MELLITUS WITHOUT COMPLICATION, WITHOUT LONG-TERM CURRENT USE OF INSULIN (HCC): ICD-10-CM

## 2021-01-07 LAB
ALBUMIN SERPL-MCNC: 4.8 G/DL (ref 3.5–5.2)
ALBUMIN/GLOB SERPL: 2.2 G/DL
ALP SERPL-CCNC: 62 U/L (ref 39–117)
ALT SERPL-CCNC: 82 U/L (ref 1–41)
AST SERPL-CCNC: 72 U/L (ref 1–40)
BILIRUB SERPL-MCNC: 0.5 MG/DL (ref 0–1.2)
BUN SERPL-MCNC: 12 MG/DL (ref 6–20)
BUN/CREAT SERPL: 13.5 (ref 7–25)
CALCIUM SERPL-MCNC: 9.8 MG/DL (ref 8.6–10.5)
CHLORIDE SERPL-SCNC: 99 MMOL/L (ref 98–107)
CO2 SERPL-SCNC: 27.9 MMOL/L (ref 22–29)
CREAT SERPL-MCNC: 0.89 MG/DL (ref 0.76–1.27)
GLOBULIN SER CALC-MCNC: 2.2 GM/DL
GLUCOSE SERPL-MCNC: 148 MG/DL (ref 65–99)
HBA1C MFR BLD: 7.6 % (ref 4.8–5.6)
HCV AB S/CO SERPL IA: <0.1 S/CO RATIO (ref 0–0.9)
POTASSIUM SERPL-SCNC: 4.4 MMOL/L (ref 3.5–5.2)
PROT SERPL-MCNC: 7 G/DL (ref 6–8.5)
SODIUM SERPL-SCNC: 136 MMOL/L (ref 136–145)

## 2021-01-14 ENCOUNTER — OFFICE VISIT (OUTPATIENT)
Dept: INTERNAL MEDICINE | Facility: CLINIC | Age: 46
End: 2021-01-14

## 2021-01-14 VITALS
TEMPERATURE: 97.7 F | BODY MASS INDEX: 31.15 KG/M2 | HEIGHT: 72 IN | WEIGHT: 230 LBS | DIASTOLIC BLOOD PRESSURE: 82 MMHG | SYSTOLIC BLOOD PRESSURE: 132 MMHG

## 2021-01-14 DIAGNOSIS — Z00.00 WELLNESS EXAMINATION: ICD-10-CM

## 2021-01-14 DIAGNOSIS — R94.5 ABNORMAL LIVER FUNCTION: ICD-10-CM

## 2021-01-14 DIAGNOSIS — E11.9 TYPE 2 DIABETES MELLITUS WITHOUT COMPLICATION, WITHOUT LONG-TERM CURRENT USE OF INSULIN (HCC): Chronic | ICD-10-CM

## 2021-01-14 DIAGNOSIS — Z12.5 SCREENING FOR PROSTATE CANCER: ICD-10-CM

## 2021-01-14 DIAGNOSIS — I10 ESSENTIAL HYPERTENSION: Primary | Chronic | ICD-10-CM

## 2021-01-14 PROBLEM — E78.49 OTHER HYPERLIPIDEMIA: Chronic | Status: ACTIVE | Noted: 2019-06-10

## 2021-01-14 PROCEDURE — 99214 OFFICE O/P EST MOD 30 MIN: CPT | Performed by: INTERNAL MEDICINE

## 2021-01-14 NOTE — PROGRESS NOTES
Subjective        Chief Complaint   Patient presents with   • Hypertension           Kaushal Jacobs is a 45 y.o. male who presents for    Patient Active Problem List   Diagnosis   • Essential hypertension   • Other hyperlipidemia   • Asthma   • Type 2 diabetes mellitus without complication, without long-term current use of insulin (CMS/[facility])   • Dyspepsia   • Abnormal liver function       History of Present Illness     He is not exercising. He does not check his BP or sugars. He is not wheezing. He will feel a twinge of pain on his chest for seconds. He denies nausea, vomiting or abdominal pain. He thinks his sister has fatty liver. His eyes and mouth are dry sometimes. He has not seen the eye doctor recently.  Allergies   Allergen Reactions   • Penicillins        Current Outpatient Medications on File Prior to Visit   Medication Sig Dispense Refill   • ALBUTEROL SULFATE  (90 Base) MCG/ACT inhaler INHALE 2 PUFFS BY MOUTH EVERY 4 HOURS AS NEEDED 20.1 inhaler 0   • atorvastatin (LIPITOR) 20 MG tablet TAKE 1 TABLET BY MOUTH EVERY DAY 90 tablet 3   • diazePAM (VALIUM) 5 MG tablet Take 1 tablet by mouth 2 (Two) Times a Day As Needed for Anxiety. AS NEEDED PRIOR TO FLIGHT 6 tablet 0   • lisinopril (PRINIVIL,ZESTRIL) 10 MG tablet TAKE 1 TABLET BY MOUTH EVERY DAY 90 tablet 3   • metFORMIN (GLUCOPHAGE) 1000 MG tablet Take 1 tablet by mouth 2 (two) times a day. 60 tablet 3   • omeprazole (PrilOSEC) 20 MG capsule Take 1 capsule by mouth Daily. 30 capsule 3   • Symbicort 80-4.5 MCG/ACT inhaler INHALE 2 PUFFS BY MOUTH TWICE A DAY 30.6 inhaler 0   • temazepam (RESTORIL) 15 MG capsule Take 1 capsule by mouth At Night As Needed for Sleep. 15 capsule 0     No current facility-administered medications on file prior to visit.        Past Medical History:   Diagnosis Date   • Carpal tunnel syndrome    • GERD (gastroesophageal reflux disease)    • Insomnia        Past Surgical History:   Procedure Laterality Date   • ENDOSCOPY    "  • KNEE SURGERY Right 2000    ACL   • TONSILLECTOMY     • UMBILICAL HERNIA REPAIR         Family History   Problem Relation Age of Onset   • Hyperlipidemia Mother    • Hypertension Mother    • Diabetes Mother    • Alzheimer's disease Mother    • COPD Father    • Heart disease Father    • Hyperlipidemia Father    • Hypertension Father    • Liver disease Sister         fatty liver       Social History     Socioeconomic History   • Marital status: Single     Spouse name: Not on file   • Number of children: Not on file   • Years of education: Not on file   • Highest education level: Not on file   Tobacco Use   • Smoking status: Never Smoker   • Smokeless tobacco: Never Used   Substance and Sexual Activity   • Alcohol use: Yes     Frequency: 4 or more times a week     Drinks per session: 1 or 2     Comment: 2 drinks daily   • Drug use: No   • Sexual activity: Yes     Partners: Male     Birth control/protection: Condom     Comment: he is sexually active with condoms but he does not have a stable partner           The following portions of the patient's history were reviewed and updated as appropriate: problem list, allergies, current medications, past medical history, past family history, past social history and past surgical history.    Review of Systems    Immunization History   Administered Date(s) Administered   • Flu Vaccine Quad PF >18YRS 09/16/2016, 11/20/2018   • Flulaval/Fluarix/Fluzone Quad 09/24/2020   • Hepatitis A 01/01/2011   • Hepatitis B 01/01/2011   • Pneumococcal Polysaccharide (PPSV23) 09/24/2020   • Tdap 01/01/2009, 06/10/2019   • Typhoid, Unspecified 01/01/2016       Objective   Vitals:    01/14/21 1642   BP: 132/82   Temp: 97.7 °F (36.5 °C)   Weight: 104 kg (230 lb)   Height: 182.9 cm (72\")     Body mass index is 31.19 kg/m².  Physical Exam  Vitals signs reviewed.   Constitutional:       Appearance: He is well-developed.   HENT:      Head: Normocephalic and atraumatic.   Cardiovascular:      Rate " and Rhythm: Normal rate and regular rhythm.      Heart sounds: Normal heart sounds, S1 normal and S2 normal.   Pulmonary:      Effort: Pulmonary effort is normal.      Breath sounds: Normal breath sounds.   Abdominal:      General: There is no distension.      Palpations: Abdomen is soft. There is no mass.   Skin:     General: Skin is warm.   Neurological:      Mental Status: He is alert.   Psychiatric:         Behavior: Behavior normal.         Procedures    Assessment/Plan   Diagnoses and all orders for this visit:    1. Essential hypertension (Primary)    2. Type 2 diabetes mellitus without complication, without long-term current use of insulin (CMS/HCC)  -     SITagliptin (Januvia) 100 MG tablet; Take 1 tablet by mouth Daily.  Dispense: 30 tablet; Refill: 3  -     Hemoglobin A1c; Future  -     Lipid Panel With / Chol / HDL Ratio; Future  -     Microalbumin / Creatinine Urine Ratio - Urine, Clean Catch; Future    3. Abnormal liver function  -     US Liver; Future  -     Comprehensive Metabolic Panel; Future  -     Hepatitis B Surf Antibody Quant; Future  -     Hepatitis B Core Ab W/Reflex; Future  -     Hepatitis B Surface Antigen; Future  -     Celiac Disease Panel; Future  -     Ferritin; Future  -     Ceruloplasmin; Future  -     Alpha - 1 - Antitrypsin; Future    4. Wellness examination  -     HIV-1 / O / 2 Ag / Antibody 4th Generation; Future  -     RPR; Future    5. Screening for prostate cancer  -     PSA Screen; Future             Reviewed cmp, a1c and hep C. Add Januvia and work up liver function; likely has fatty liver. Recc wt loss and exercise. He will see the eye doctor. We can consider getting SSA and SSB if dry mouth continues.SBP two points from goal; recc wt loss as well.    Return in about 3 months (around 4/14/2021) for Annual physical.

## 2021-01-24 ENCOUNTER — HOSPITAL ENCOUNTER (OUTPATIENT)
Dept: ULTRASOUND IMAGING | Facility: HOSPITAL | Age: 46
Discharge: HOME | End: 2021-01-24
Admitting: INTERNAL MEDICINE

## 2021-01-24 DIAGNOSIS — R94.5 ABNORMAL LIVER FUNCTION: ICD-10-CM

## 2021-01-24 PROCEDURE — 76705 ECHO EXAM OF ABDOMEN: CPT

## 2021-01-25 DIAGNOSIS — R10.13 DYSPEPSIA: ICD-10-CM

## 2021-01-25 RX ORDER — OMEPRAZOLE 20 MG/1
CAPSULE, DELAYED RELEASE ORAL
Qty: 90 CAPSULE | Refills: 1 | Status: SHIPPED | OUTPATIENT
Start: 2021-01-25 | End: 2021-07-21 | Stop reason: SDUPTHER

## 2021-02-28 ENCOUNTER — IMMUNIZATION (OUTPATIENT)
Dept: VACCINE CLINIC | Facility: HOSPITAL | Age: 46
End: 2021-02-28

## 2021-02-28 PROCEDURE — 91300 HC SARSCOV02 VAC 30MCG/0.3ML IM: CPT | Performed by: INTERNAL MEDICINE

## 2021-02-28 PROCEDURE — 0001A: CPT | Performed by: INTERNAL MEDICINE

## 2021-03-21 ENCOUNTER — IMMUNIZATION (OUTPATIENT)
Dept: VACCINE CLINIC | Facility: HOSPITAL | Age: 46
End: 2021-03-21

## 2021-03-21 PROCEDURE — 0002A: CPT | Performed by: INTERNAL MEDICINE

## 2021-03-21 PROCEDURE — 91300 HC SARSCOV02 VAC 30MCG/0.3ML IM: CPT | Performed by: INTERNAL MEDICINE

## 2021-04-06 DIAGNOSIS — E11.9 TYPE 2 DIABETES MELLITUS WITHOUT COMPLICATION, WITHOUT LONG-TERM CURRENT USE OF INSULIN (HCC): Chronic | ICD-10-CM

## 2021-04-06 DIAGNOSIS — Z00.00 WELLNESS EXAMINATION: ICD-10-CM

## 2021-04-06 DIAGNOSIS — Z12.5 SCREENING FOR PROSTATE CANCER: ICD-10-CM

## 2021-04-06 DIAGNOSIS — R94.5 ABNORMAL LIVER FUNCTION: ICD-10-CM

## 2021-04-12 LAB
A1AT SERPL-MCNC: 131 MG/DL (ref 101–187)
ALBUMIN SERPL-MCNC: 4.9 G/DL (ref 3.5–5.2)
ALBUMIN/CREAT UR: 5 MG/G CREAT (ref 0–29)
ALBUMIN/GLOB SERPL: 2.3 G/DL
ALP SERPL-CCNC: 46 U/L (ref 39–117)
ALT SERPL-CCNC: 35 U/L (ref 1–41)
AST SERPL-CCNC: 25 U/L (ref 1–40)
BILIRUB SERPL-MCNC: 0.4 MG/DL (ref 0–1.2)
BUN SERPL-MCNC: 18 MG/DL (ref 6–20)
BUN/CREAT SERPL: 15.9 (ref 7–25)
CALCIUM SERPL-MCNC: 9.6 MG/DL (ref 8.6–10.5)
CERULOPLASMIN SERPL-MCNC: 23.1 MG/DL (ref 16–31)
CHLORIDE SERPL-SCNC: 105 MMOL/L (ref 98–107)
CHOLEST SERPL-MCNC: 116 MG/DL (ref 0–200)
CHOLEST/HDLC SERPL: 3.22 {RATIO}
CO2 SERPL-SCNC: 23.6 MMOL/L (ref 22–29)
CREAT SERPL-MCNC: 1.13 MG/DL (ref 0.76–1.27)
CREAT UR-MCNC: 262.4 MG/DL
ENDOMYSIUM IGA SER QL: NEGATIVE
FERRITIN SERPL-MCNC: 119 NG/ML (ref 30–400)
GLOBULIN SER CALC-MCNC: 2.1 GM/DL
GLUCOSE SERPL-MCNC: 120 MG/DL (ref 65–99)
HBA1C MFR BLD: 6.1 % (ref 4.8–5.6)
HBV CORE AB SERPL QL IA: NEGATIVE
HBV SURFACE AB SER-ACNC: 96.1 MIU/ML
HBV SURFACE AG SERPL QL IA: NEGATIVE
HDLC SERPL-MCNC: 36 MG/DL (ref 40–60)
HIV 1+2 AB+HIV1 P24 AG SERPL QL IA: NON REACTIVE
IGA SERPL-MCNC: <5 MG/DL (ref 90–386)
LDLC SERPL CALC-MCNC: 47 MG/DL (ref 0–100)
MICROALBUMIN UR-MCNC: 13.1 UG/ML
POTASSIUM SERPL-SCNC: 4.7 MMOL/L (ref 3.5–5.2)
PROT SERPL-MCNC: 7 G/DL (ref 6–8.5)
PSA SERPL-MCNC: 1.17 NG/ML (ref 0–4)
RPR SER QL: NORMAL
SODIUM SERPL-SCNC: 142 MMOL/L (ref 136–145)
TRIGL SERPL-MCNC: 206 MG/DL (ref 0–150)
TTG IGA SER-ACNC: <2 U/ML (ref 0–3)
TTG IGG SER-ACNC: <2 U/ML (ref 0–5)
VLDLC SERPL CALC-MCNC: 33 MG/DL (ref 5–40)

## 2021-04-16 ENCOUNTER — OFFICE VISIT (OUTPATIENT)
Dept: INTERNAL MEDICINE | Facility: CLINIC | Age: 46
End: 2021-04-16

## 2021-04-16 VITALS
TEMPERATURE: 98 F | WEIGHT: 213 LBS | BODY MASS INDEX: 28.85 KG/M2 | HEIGHT: 72 IN | SYSTOLIC BLOOD PRESSURE: 132 MMHG | DIASTOLIC BLOOD PRESSURE: 72 MMHG

## 2021-04-16 DIAGNOSIS — Z00.00 WELLNESS EXAMINATION: Primary | ICD-10-CM

## 2021-04-16 DIAGNOSIS — E11.9 TYPE 2 DIABETES MELLITUS WITHOUT COMPLICATION, WITHOUT LONG-TERM CURRENT USE OF INSULIN (HCC): ICD-10-CM

## 2021-04-16 DIAGNOSIS — E11.9 TYPE 2 DIABETES MELLITUS WITHOUT COMPLICATION, WITHOUT LONG-TERM CURRENT USE OF INSULIN (HCC): Chronic | ICD-10-CM

## 2021-04-16 DIAGNOSIS — E78.49 OTHER HYPERLIPIDEMIA: Chronic | ICD-10-CM

## 2021-04-16 DIAGNOSIS — I10 ESSENTIAL HYPERTENSION: Chronic | ICD-10-CM

## 2021-04-16 DIAGNOSIS — K76.0 FATTY LIVER: ICD-10-CM

## 2021-04-16 DIAGNOSIS — Z12.11 SCREENING FOR COLON CANCER: ICD-10-CM

## 2021-04-16 DIAGNOSIS — R76.8 LOW SERUM IGA FOR AGE: ICD-10-CM

## 2021-04-16 PROCEDURE — 99396 PREV VISIT EST AGE 40-64: CPT | Performed by: INTERNAL MEDICINE

## 2021-04-16 RX ORDER — LISINOPRIL 20 MG/1
20 TABLET ORAL DAILY
Qty: 30 TABLET | Refills: 3 | Status: SHIPPED | OUTPATIENT
Start: 2021-04-16 | End: 2021-08-18

## 2021-04-16 NOTE — PROGRESS NOTES
Subjective        Chief Complaint   Patient presents with   • Annual Exam   • Hypertension   • Diabetes           Kaushal Jacobs is a 45 y.o. 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 He has not been checking his BP or sugars. He denies chest pain, dyspnea, cough, or wheeze.  Allergies   Allergen Reactions   • Penicillins        Current Outpatient Medications on File Prior to Visit   Medication Sig Dispense Refill   • ALBUTEROL SULFATE  (90 Base) MCG/ACT inhaler INHALE 2 PUFFS BY MOUTH EVERY 4 HOURS AS NEEDED 20.1 inhaler 0   • atorvastatin (LIPITOR) 20 MG tablet TAKE 1 TABLET BY MOUTH EVERY DAY 90 tablet 3   • omeprazole (priLOSEC) 20 MG capsule TAKE 1 CAPSULE BY MOUTH EVERY DAY 90 capsule 1   • SITagliptin (Januvia) 100 MG tablet Take 1 tablet by mouth Daily. 30 tablet 3   • Symbicort 80-4.5 MCG/ACT inhaler INHALE 2 PUFFS BY MOUTH TWICE A DAY 30.6 inhaler 0   • diazePAM (VALIUM) 5 MG tablet Take 1 tablet by mouth 2 (Two) Times a Day As Needed for Anxiety. AS NEEDED PRIOR TO FLIGHT 6 tablet 0   • temazepam (RESTORIL) 15 MG capsule Take 1 capsule by mouth At Night As Needed for Sleep. 15 capsule 0     No current facility-administered medications on file prior to visit.       Past Medical History:   Diagnosis Date   • Carpal tunnel syndrome    • GERD (gastroesophageal reflux disease)    • Insomnia        Past Surgical History:   Procedure Laterality Date   • ENDOSCOPY     • KNEE SURGERY Right 2000    ACL   • TONSILLECTOMY     • UMBILICAL HERNIA REPAIR         Family History   Problem Relation Age of Onset   • Hyperlipidemia Mother    • Hypertension Mother    • Diabetes Mother    •  Alzheimer's disease Mother    • COPD Father    • Heart disease Father    • Hyperlipidemia Father    • Hypertension Father    • Liver disease Sister         fatty liver       Social History     Socioeconomic History   • Marital status: Single     Spouse name: Not on file   • Number of children: Not on file   • Years of education: Not on file   • Highest education level: Not on file   Tobacco Use   • Smoking status: Never Smoker   • Smokeless tobacco: Never Used   Substance and Sexual Activity   • Alcohol use: Yes     Comment: 2 drinks daily   • Drug use: No   • Sexual activity: Yes     Partners: Male     Birth control/protection: Condom     Comment: he uses condoms           The following portions of the patient's history were reviewed and updated as appropriate: problem list, allergies, current medications, past medical history, past family history, past social history and past surgical history.    Review of Systems   Constitutional: Negative for chills, fever and unexpected weight loss.   HENT: Negative for postnasal drip and sore throat.    Eyes: Negative for blurred vision.   Respiratory: Negative for cough, shortness of breath and wheezing.    Cardiovascular: Negative for chest pain and leg swelling.   Gastrointestinal: Negative for abdominal pain, blood in stool, nausea, vomiting and GERD.   Endocrine: Negative for polyuria.   Genitourinary: Negative for dysuria, frequency and hematuria.   Musculoskeletal: Negative for gait problem.   Skin: Negative for rash.   Allergic/Immunologic: Negative for immunocompromised state.   Neurological: Negative for weakness.   Hematological: Does not bruise/bleed easily.   Psychiatric/Behavioral: Negative for depressed mood. The patient is not nervous/anxious.        Immunization History   Administered Date(s) Administered   • COVID-19 (PFIZER) 02/28/2021, 03/21/2021   • Flu Vaccine Quad PF >18YRS 09/16/2016, 11/20/2018   • Flulaval/Fluarix/Fluzone Quad 09/24/2020   • Hepatitis  "A 01/01/2011   • Hepatitis B 01/01/2011   • Pneumococcal Polysaccharide (PPSV23) 09/24/2020   • Tdap 01/01/2009, 06/10/2019   • Typhoid, Unspecified 01/01/2016       Objective   Vitals:    04/16/21 1615   BP: 132/72   Temp: 98 °F (36.7 °C)   Weight: 96.6 kg (213 lb)   Height: 182.9 cm (72\")     Body mass index is 28.89 kg/m².  Physical Exam  Vitals reviewed.   Constitutional:       Appearance: He is well-developed.   HENT:      Head: Normocephalic and atraumatic.      Mouth/Throat:      Mouth: Mucous membranes are moist.      Pharynx: Oropharynx is clear.   Eyes:      Extraocular Movements: Extraocular movements intact.      Conjunctiva/sclera: Conjunctivae normal.      Pupils: Pupils are equal, round, and reactive to light.   Neck:      Thyroid: No thyromegaly.      Vascular: No carotid bruit.   Cardiovascular:      Rate and Rhythm: Normal rate and regular rhythm.      Heart sounds: Normal heart sounds. No murmur heard.     Pulmonary:      Effort: Pulmonary effort is normal.      Breath sounds: Normal breath sounds.   Abdominal:      General: There is no distension.      Palpations: Abdomen is soft. There is no mass.      Tenderness: There is no abdominal tenderness. There is no rebound.   Genitourinary:     Prostate: Normal.      Rectum: Normal.   Musculoskeletal:      Cervical back: Neck supple.   Feet:      Right foot:      Protective Sensation: 5 sites tested. 5 sites sensed.      Skin integrity: Skin integrity normal.      Left foot:      Protective Sensation: 5 sites tested. 5 sites sensed.      Skin integrity: Skin integrity normal.   Lymphadenopathy:      Cervical: No cervical adenopathy.   Skin:     General: Skin is warm.   Neurological:      Mental Status: He is alert.   Psychiatric:         Behavior: Behavior normal.         Procedures    Assessment/Plan   Diagnoses and all orders for this visit:    1. Wellness examination (Primary)    2. Fatty liver  -     Comprehensive Metabolic Panel; Future  -     " Gliadin Antibody, IgG; Future    3. Type 2 diabetes mellitus without complication, without long-term current use of insulin (CMS/[facility])  -     Hemoglobin A1c; Future    4. Other hyperlipidemia    5. Essential hypertension  -     lisinopril (PRINIVIL,ZESTRIL) 20 MG tablet; Take 1 tablet by mouth Daily.  Dispense: 30 tablet; Refill: 3    6. Screening for colon cancer  -     Ambulatory Referral For Screening Colonoscopy    7. 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 Set up screening cscope. Discussed exercising 150 minutes per week. A1c is better. Continue current meds and re eval need for Januvia next time depending on what a1c does with further weight loss. LDL is at goal. Reviewed labs including low IgA level; I will repeat it next time. We discussed its meaning and it is reassuring that he has not had recurrent infections. LDL is at goal.  Return in about 3 months (around 7/16/2021) for Lab Before FUP.

## 2021-04-16 NOTE — PROGRESS NOTES
Subjective        Chief Complaint   Patient presents with   • Hypertension           Kaushal Jacobs is a 45 y.o. male who presents for    Patient Active Problem List   Diagnosis   • Essential hypertension   • Other hyperlipidemia   • Asthma   • Type 2 diabetes mellitus without complication, without long-term current use of insulin (CMS/[facility])   • Dyspepsia   • Abnormal liver function       History of Present Illness       Allergies   Allergen Reactions   • Penicillins        Current Outpatient Medications on File Prior to Visit   Medication Sig Dispense Refill   • ALBUTEROL SULFATE  (90 Base) MCG/ACT inhaler INHALE 2 PUFFS BY MOUTH EVERY 4 HOURS AS NEEDED 20.1 inhaler 0   • atorvastatin (LIPITOR) 20 MG tablet TAKE 1 TABLET BY MOUTH EVERY DAY 90 tablet 3   • lisinopril (PRINIVIL,ZESTRIL) 10 MG tablet TAKE 1 TABLET BY MOUTH EVERY DAY 90 tablet 3   • metFORMIN (GLUCOPHAGE) 1000 MG tablet Take 1 tablet by mouth 2 (two) times a day. 60 tablet 3   • omeprazole (priLOSEC) 20 MG capsule TAKE 1 CAPSULE BY MOUTH EVERY DAY 90 capsule 1   • SITagliptin (Januvia) 100 MG tablet Take 1 tablet by mouth Daily. 30 tablet 3   • Symbicort 80-4.5 MCG/ACT inhaler INHALE 2 PUFFS BY MOUTH TWICE A DAY 30.6 inhaler 0   • diazePAM (VALIUM) 5 MG tablet Take 1 tablet by mouth 2 (Two) Times a Day As Needed for Anxiety. AS NEEDED PRIOR TO FLIGHT 6 tablet 0   • temazepam (RESTORIL) 15 MG capsule Take 1 capsule by mouth At Night As Needed for Sleep. 15 capsule 0     No current facility-administered medications on file prior to visit.       Past Medical History:   Diagnosis Date   • Carpal tunnel syndrome    • GERD (gastroesophageal reflux disease)    • Insomnia        Past Surgical History:   Procedure Laterality Date   • ENDOSCOPY     • KNEE SURGERY Right 2000    ACL   • TONSILLECTOMY     • UMBILICAL HERNIA REPAIR         Family History   Problem Relation Age of Onset   • Hyperlipidemia Mother    • Hypertension Mother    • Diabetes Mother    •  "Alzheimer's disease Mother    • COPD Father    • Heart disease Father    • Hyperlipidemia Father    • Hypertension Father    • Liver disease Sister         fatty liver       Social History     Socioeconomic History   • Marital status: Single     Spouse name: Not on file   • Number of children: Not on file   • Years of education: Not on file   • Highest education level: Not on file   Tobacco Use   • Smoking status: Never Smoker   • Smokeless tobacco: Never Used   Substance and Sexual Activity   • Alcohol use: Yes     Comment: 2 drinks daily   • Drug use: No   • Sexual activity: Yes     Partners: Male     Birth control/protection: Condom     Comment: he is sexually active with condoms but he does not have a stable partner           The following portions of the patient's history were reviewed and updated as appropriate: {history reviewed:84275::\"problem list\",\"allergies\",\"current medications\",\"past medical history\",\"past family history\",\"past social history\",\"past surgical history\"}.    Review of Systems    Immunization History   Administered Date(s) Administered   • COVID-19 (PFIZER) 02/28/2021, 03/21/2021   • Flu Vaccine Quad PF >18YRS 09/16/2016, 11/20/2018   • Flulaval/Fluarix/Fluzone Quad 09/24/2020   • Hepatitis A 01/01/2011   • Hepatitis B 01/01/2011   • Pneumococcal Polysaccharide (PPSV23) 09/24/2020   • Tdap 01/01/2009, 06/10/2019   • Typhoid, Unspecified 01/01/2016       Objective   Vitals:    04/16/21 1615   Temp: 98 °F (36.7 °C)   Weight: 104 kg (229 lb)   Height: 182.9 cm (72\")     Body mass index is 31.06 kg/m².  Physical Exam    Procedures    Assessment/Plan   {Assess/PlanSmartLinks:83835}             No follow-ups on file.  "

## 2021-05-20 DIAGNOSIS — E11.9 TYPE 2 DIABETES MELLITUS WITHOUT COMPLICATION, WITHOUT LONG-TERM CURRENT USE OF INSULIN (HCC): Chronic | ICD-10-CM

## 2021-05-20 RX ORDER — SITAGLIPTIN 100 MG/1
TABLET, FILM COATED ORAL
Qty: 30 TABLET | Refills: 3 | Status: SHIPPED | OUTPATIENT
Start: 2021-05-20 | End: 2021-07-16

## 2021-06-01 ENCOUNTER — TELEPHONE (OUTPATIENT)
Dept: GASTROENTEROLOGY | Facility: CLINIC | Age: 46
End: 2021-06-01

## 2021-06-04 ENCOUNTER — PREP FOR SURGERY (OUTPATIENT)
Dept: SURGERY | Facility: SURGERY CENTER | Age: 46
End: 2021-06-04

## 2021-06-04 DIAGNOSIS — Z12.11 ENCOUNTER FOR SCREENING FOR MALIGNANT NEOPLASM OF COLON: Primary | ICD-10-CM

## 2021-06-04 RX ORDER — SODIUM CHLORIDE 0.9 % (FLUSH) 0.9 %
10 SYRINGE (ML) INJECTION AS NEEDED
Status: CANCELLED | OUTPATIENT
Start: 2021-06-04

## 2021-06-04 RX ORDER — SODIUM CHLORIDE 0.9 % (FLUSH) 0.9 %
3 SYRINGE (ML) INJECTION EVERY 12 HOURS SCHEDULED
Status: CANCELLED | OUTPATIENT
Start: 2021-06-04

## 2021-06-04 RX ORDER — SODIUM CHLORIDE, SODIUM LACTATE, POTASSIUM CHLORIDE, CALCIUM CHLORIDE 600; 310; 30; 20 MG/100ML; MG/100ML; MG/100ML; MG/100ML
30 INJECTION, SOLUTION INTRAVENOUS CONTINUOUS PRN
Status: CANCELLED | OUTPATIENT
Start: 2021-06-04

## 2021-06-07 PROBLEM — Z12.11 ENCOUNTER FOR SCREENING FOR MALIGNANT NEOPLASM OF COLON: Status: ACTIVE | Noted: 2021-06-07

## 2021-07-08 DIAGNOSIS — R76.8 LOW SERUM IGA FOR AGE: ICD-10-CM

## 2021-07-08 DIAGNOSIS — E11.9 TYPE 2 DIABETES MELLITUS WITHOUT COMPLICATION, WITHOUT LONG-TERM CURRENT USE OF INSULIN (HCC): Chronic | ICD-10-CM

## 2021-07-08 DIAGNOSIS — K76.0 FATTY LIVER: ICD-10-CM

## 2021-07-16 ENCOUNTER — OFFICE VISIT (OUTPATIENT)
Dept: INTERNAL MEDICINE | Facility: CLINIC | Age: 46
End: 2021-07-16

## 2021-07-16 VITALS
SYSTOLIC BLOOD PRESSURE: 120 MMHG | TEMPERATURE: 98 F | DIASTOLIC BLOOD PRESSURE: 76 MMHG | BODY MASS INDEX: 28.04 KG/M2 | WEIGHT: 207 LBS | HEIGHT: 72 IN

## 2021-07-16 DIAGNOSIS — I10 ESSENTIAL HYPERTENSION: Chronic | ICD-10-CM

## 2021-07-16 DIAGNOSIS — J45.20 MILD INTERMITTENT ASTHMA WITHOUT COMPLICATION: ICD-10-CM

## 2021-07-16 DIAGNOSIS — F51.01 PRIMARY INSOMNIA: ICD-10-CM

## 2021-07-16 DIAGNOSIS — Z78.9 HISTORY OF FOREIGN TRAVEL: ICD-10-CM

## 2021-07-16 DIAGNOSIS — R20.0 LEFT LEG NUMBNESS: ICD-10-CM

## 2021-07-16 DIAGNOSIS — K76.0 FATTY LIVER: ICD-10-CM

## 2021-07-16 DIAGNOSIS — E11.9 TYPE 2 DIABETES MELLITUS WITHOUT COMPLICATION, WITHOUT LONG-TERM CURRENT USE OF INSULIN (HCC): Primary | Chronic | ICD-10-CM

## 2021-07-16 DIAGNOSIS — R76.8 LOW SERUM IGA FOR AGE: ICD-10-CM

## 2021-07-16 LAB
ALBUMIN SERPL-MCNC: 4.6 G/DL (ref 3.5–5.2)
ALBUMIN/GLOB SERPL: 2.2 G/DL
ALP SERPL-CCNC: 40 U/L (ref 39–117)
ALT SERPL-CCNC: 22 U/L (ref 1–41)
AST SERPL-CCNC: 16 U/L (ref 1–40)
BASOPHILS # BLD AUTO: 0.03 10*3/MM3 (ref 0–0.2)
BASOPHILS NFR BLD AUTO: 0.4 % (ref 0–1.5)
BILIRUB SERPL-MCNC: 0.5 MG/DL (ref 0–1.2)
BUN SERPL-MCNC: 10 MG/DL (ref 6–20)
BUN/CREAT SERPL: 13.2 (ref 7–25)
CALCIUM SERPL-MCNC: 9.7 MG/DL (ref 8.6–10.5)
CHLORIDE SERPL-SCNC: 104 MMOL/L (ref 98–107)
CO2 SERPL-SCNC: 22 MMOL/L (ref 22–29)
CREAT SERPL-MCNC: 0.76 MG/DL (ref 0.76–1.27)
EOSINOPHIL # BLD AUTO: 0.44 10*3/MM3 (ref 0–0.4)
EOSINOPHIL NFR BLD AUTO: 6.5 % (ref 0.3–6.2)
ERYTHROCYTE [DISTWIDTH] IN BLOOD BY AUTOMATED COUNT: 13.8 % (ref 12.3–15.4)
GLIADIN PEPTIDE IGG SER-ACNC: 1 UNITS (ref 0–19)
GLOBULIN SER CALC-MCNC: 2.1 GM/DL
GLUCOSE SERPL-MCNC: 106 MG/DL (ref 65–99)
HBA1C MFR BLD: 5.9 % (ref 4.8–5.6)
HCT VFR BLD AUTO: 42.5 % (ref 37.5–51)
HGB BLD-MCNC: 14.1 G/DL (ref 13–17.7)
IGA SERPL-MCNC: <5 MG/DL (ref 90–386)
IGE SERPL-ACNC: 3 IU/ML (ref 6–495)
IGG SERPL-MCNC: 957 MG/DL (ref 603–1613)
IGM SERPL-MCNC: 82 MG/DL (ref 20–172)
IMM GRANULOCYTES # BLD AUTO: 0.03 10*3/MM3 (ref 0–0.05)
IMM GRANULOCYTES NFR BLD AUTO: 0.4 % (ref 0–0.5)
LYMPHOCYTES # BLD AUTO: 1.93 10*3/MM3 (ref 0.7–3.1)
LYMPHOCYTES NFR BLD AUTO: 28.6 % (ref 19.6–45.3)
MCH RBC QN AUTO: 29.6 PG (ref 26.6–33)
MCHC RBC AUTO-ENTMCNC: 33.2 G/DL (ref 31.5–35.7)
MCV RBC AUTO: 89.1 FL (ref 79–97)
MONOCYTES # BLD AUTO: 0.58 10*3/MM3 (ref 0.1–0.9)
MONOCYTES NFR BLD AUTO: 8.6 % (ref 5–12)
NEUTROPHILS # BLD AUTO: 3.73 10*3/MM3 (ref 1.7–7)
NEUTROPHILS NFR BLD AUTO: 55.5 % (ref 42.7–76)
NRBC BLD AUTO-RTO: 0 /100 WBC (ref 0–0.2)
PLATELET # BLD AUTO: 251 10*3/MM3 (ref 140–450)
POTASSIUM SERPL-SCNC: 4.6 MMOL/L (ref 3.5–5.2)
PROT SERPL-MCNC: 6.7 G/DL (ref 6–8.5)
RBC # BLD AUTO: 4.77 10*6/MM3 (ref 4.14–5.8)
SODIUM SERPL-SCNC: 139 MMOL/L (ref 136–145)
WBC # BLD AUTO: 6.74 10*3/MM3 (ref 3.4–10.8)

## 2021-07-16 PROCEDURE — 99214 OFFICE O/P EST MOD 30 MIN: CPT | Performed by: INTERNAL MEDICINE

## 2021-07-16 RX ORDER — ZOLPIDEM TARTRATE 10 MG/1
10 TABLET ORAL NIGHTLY PRN
Qty: 30 TABLET | Refills: 3 | Status: SHIPPED | OUTPATIENT
Start: 2021-07-16 | End: 2022-01-20

## 2021-07-16 RX ORDER — CIPROFLOXACIN 500 MG/1
500 TABLET, FILM COATED ORAL 2 TIMES DAILY
Qty: 6 TABLET | Refills: 0 | Status: ON HOLD | OUTPATIENT
Start: 2021-07-16 | End: 2021-09-20

## 2021-07-16 NOTE — PROGRESS NOTES
Subjective        Chief Complaint   Patient presents with   • Diabetes           Kaushal Jacobs is a 45 y.o. 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 He has not been checking his BP or sugars. He has had tingling from left knee down for a couple years. It is intermittent. He is not sure what makes it worse. He has no back pain. He can fall asleep okay but he wakes up and it can be hard to fall back asleep. He takes a benadryl that does not always help. He does not think Melatonin helps. Years ago he took Ambien and it helped.  Allergies   Allergen Reactions   • Penicillins        Current Outpatient Medications on File Prior to Visit   Medication Sig Dispense Refill   • ALBUTEROL SULFATE  (90 Base) MCG/ACT inhaler INHALE 2 PUFFS BY MOUTH EVERY 4 HOURS AS NEEDED 20.1 inhaler 0   • atorvastatin (LIPITOR) 20 MG tablet TAKE 1 TABLET BY MOUTH EVERY DAY 90 tablet 3   • lisinopril (PRINIVIL,ZESTRIL) 20 MG tablet Take 1 tablet by mouth Daily. 30 tablet 3   • metFORMIN (GLUCOPHAGE) 1000 MG tablet TAKE 1 TABLET BY MOUTH TWICE A DAY 60 tablet 3   • omeprazole (priLOSEC) 20 MG capsule TAKE 1 CAPSULE BY MOUTH EVERY DAY 90 capsule 1   • Symbicort 80-4.5 MCG/ACT inhaler INHALE 2 PUFFS BY MOUTH TWICE A DAY 30.6 inhaler 0   • [DISCONTINUED] Januvia 100 MG tablet TAKE 1 TABLET BY MOUTH EVERY DAY 30 tablet 3   • diazePAM (VALIUM) 5 MG tablet Take 1 tablet by mouth 2 (Two) Times a Day As Needed for Anxiety. AS NEEDED PRIOR TO FLIGHT 6 tablet 0   • [DISCONTINUED] temazepam (RESTORIL) 15 MG capsule Take 1 capsule by mouth At Night As Needed for  Sleep. 15 capsule 0     No current facility-administered medications on file prior to visit.       Past Medical History:   Diagnosis Date   • Carpal tunnel syndrome    • GERD (gastroesophageal reflux disease)    • Insomnia        Past Surgical History:   Procedure Laterality Date   • ENDOSCOPY     • KNEE SURGERY Right 2000    ACL   • TONSILLECTOMY     • UMBILICAL HERNIA REPAIR         Family History   Problem Relation Age of Onset   • Hyperlipidemia Mother    • Hypertension Mother    • Diabetes Mother    • Alzheimer's disease Mother    • COPD Father    • Heart disease Father    • Hyperlipidemia Father    • Hypertension Father    • Liver disease Sister         fatty liver       Social History     Socioeconomic History   • Marital status: Single     Spouse name: Not on file   • Number of children: Not on file   • Years of education: Not on file   • Highest education level: Not on file   Tobacco Use   • Smoking status: Never Smoker   • Smokeless tobacco: Never Used   Substance and Sexual Activity   • Alcohol use: Yes     Comment: 2 drinks daily   • Drug use: No   • Sexual activity: Yes     Partners: Male     Birth control/protection: Condom     Comment: he uses condoms           The following portions of the patient's history were reviewed and updated as appropriate: problem list, allergies, current medications, past medical history, past family history, past social history and past surgical history.    Review of Systems    Immunization History   Administered Date(s) Administered   • COVID-19 (PFIZER) 02/28/2021, 03/21/2021   • Flu Vaccine Quad PF >18YRS 09/16/2016, 11/20/2018   • Flulaval/Fluarix/Fluzone Quad 09/24/2020   • Hepatitis A 01/01/2011   • Hepatitis B 01/01/2011   • Pneumococcal Polysaccharide (PPSV23) 09/24/2020   • Tdap 01/01/2009, 06/10/2019   • Typhoid, Unspecified 01/01/2016       Objective   Vitals:    07/16/21 1657   BP: 120/76   Temp: 98 °F (36.7 °C)   Weight: 93.9 kg (207 lb)   Height: 182.9 cm  "(72\")     Body mass index is 28.07 kg/m².  Physical Exam  Vitals reviewed.   Constitutional:       Appearance: He is well-developed.   HENT:      Head: Normocephalic and atraumatic.   Cardiovascular:      Rate and Rhythm: Normal rate and regular rhythm.      Heart sounds: Normal heart sounds, S1 normal and S2 normal.   Pulmonary:      Effort: Pulmonary effort is normal.      Breath sounds: Normal breath sounds.   Skin:     General: Skin is warm.   Neurological:      Mental Status: He is alert.      Deep Tendon Reflexes:      Reflex Scores:       Patellar reflexes are 2+ on the right side and 2+ on the left side.       Achilles reflexes are 2+ on the right side and 2+ on the left side.     Comments: 5/5 strength in legs.   Psychiatric:         Behavior: Behavior normal.         Procedures    Assessment/Plan   Diagnoses and all orders for this visit:    1. Type 2 diabetes mellitus without complication, without long-term current use of insulin (CMS/[facility]) (Primary)  Comments:  He would like to stop Januvia secondary to cost and a1c going down to 5.9. I think that is reasonable. Recc exercise.  Orders:  -     Comprehensive Metabolic Panel; Future  -     Hemoglobin A1c; Future    2. Essential hypertension  Comments:  BP is good    3. Fatty liver  Comments:  Keep working on wt loss    4. 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 Mild intermittent asthma without complication  Comments:  Stable. He will check with pharmacy about lower cost inhaler and let me know    6. Primary insomnia  Comments:  Signed controlled substance agreement and checked BOB.   Orders:  -     zolpidem (AMBIEN) 10 MG tablet; Take 1 tablet by mouth At Night As Needed for Sleep.  Dispense: 30 tablet; Refill: 3    7. History of foreign travel  -     ciprofloxacin (Cipro) 500 MG tablet; Take 1 tablet by mouth 2 (Two) Times a Day.  Dispense: 6 tablet; Refill: 0    8. Left leg numbness  -   "   EMG & Nerve Conduction Test; Future      CMP, A1c, and immunoglobulins reviewed. Cipro for travel to [place].           Return in about 3 months (around 10/16/2021) for Lab Before FUP.

## 2021-07-19 DIAGNOSIS — E11.9 TYPE 2 DIABETES MELLITUS WITHOUT COMPLICATION, WITHOUT LONG-TERM CURRENT USE OF INSULIN (HCC): ICD-10-CM

## 2021-07-21 DIAGNOSIS — R10.13 DYSPEPSIA: ICD-10-CM

## 2021-07-21 RX ORDER — ATORVASTATIN CALCIUM 20 MG/1
TABLET, FILM COATED ORAL
Qty: 90 TABLET | Refills: 3 | Status: SHIPPED | OUTPATIENT
Start: 2021-07-21 | End: 2022-05-03 | Stop reason: SDUPTHER

## 2021-07-21 RX ORDER — OMEPRAZOLE 20 MG/1
CAPSULE, DELAYED RELEASE ORAL
Qty: 90 CAPSULE | Refills: 1 | Status: SHIPPED | OUTPATIENT
Start: 2021-07-21 | End: 2021-12-20

## 2021-08-17 DIAGNOSIS — I10 ESSENTIAL HYPERTENSION: Chronic | ICD-10-CM

## 2021-08-18 RX ORDER — LISINOPRIL 10 MG/1
TABLET ORAL
Qty: 90 TABLET | Refills: 3 | Status: ON HOLD | OUTPATIENT
Start: 2021-08-18 | End: 2021-09-20

## 2021-08-18 RX ORDER — LISINOPRIL 20 MG/1
TABLET ORAL
Qty: 90 TABLET | Refills: 1 | Status: SHIPPED | OUTPATIENT
Start: 2021-08-18 | End: 2021-12-20

## 2021-09-20 ENCOUNTER — HOSPITAL ENCOUNTER (OUTPATIENT)
Facility: SURGERY CENTER | Age: 46
Setting detail: HOSPITAL OUTPATIENT SURGERY
Discharge: HOME | End: 2021-09-20
Attending: INTERNAL MEDICINE | Admitting: INTERNAL MEDICINE

## 2021-09-20 ENCOUNTER — ANESTHESIA (OUTPATIENT)
Dept: SURGERY | Facility: SURGERY CENTER | Age: 46
End: 2021-09-20

## 2021-09-20 ENCOUNTER — ANESTHESIA EVENT (OUTPATIENT)
Dept: SURGERY | Facility: SURGERY CENTER | Age: 46
End: 2021-09-20

## 2021-09-20 VITALS
SYSTOLIC BLOOD PRESSURE: 119 MMHG | TEMPERATURE: 97.3 F | RESPIRATION RATE: 16 BRPM | HEIGHT: 72 IN | WEIGHT: 204 LBS | BODY MASS INDEX: 27.63 KG/M2 | HEART RATE: 77 BPM | OXYGEN SATURATION: 96 % | DIASTOLIC BLOOD PRESSURE: 76 MMHG

## 2021-09-20 DIAGNOSIS — Z12.11 ENCOUNTER FOR SCREENING FOR MALIGNANT NEOPLASM OF COLON: ICD-10-CM

## 2021-09-20 PROCEDURE — 0DBK8ZX EXCISION OF ASCENDING COLON, VIA NATURAL OR ARTIFICIAL OPENING ENDOSCOPIC, DIAGNOSTIC: ICD-10-PCS | Performed by: INTERNAL MEDICINE

## 2021-09-20 PROCEDURE — 45380 COLONOSCOPY AND BIOPSY: CPT | Performed by: INTERNAL MEDICINE

## 2021-09-20 PROCEDURE — 88305 TISSUE EXAM BY PATHOLOGIST: CPT | Performed by: INTERNAL MEDICINE

## 2021-09-20 PROCEDURE — 25010000002 PROPOFOL 10 MG/ML EMULSION: Performed by: NURSE ANESTHETIST, CERTIFIED REGISTERED

## 2021-09-20 RX ORDER — SODIUM CHLORIDE 0.9 % (FLUSH) 0.9 %
10 SYRINGE (ML) INJECTION AS NEEDED
Status: DISCONTINUED | OUTPATIENT
Start: 2021-09-20 | End: 2021-09-20 | Stop reason: HOSPADM

## 2021-09-20 RX ORDER — PROPOFOL 10 MG/ML
VIAL (ML) INTRAVENOUS AS NEEDED
Status: DISCONTINUED | OUTPATIENT
Start: 2021-09-20 | End: 2021-09-20 | Stop reason: SURG

## 2021-09-20 RX ORDER — LIDOCAINE HYDROCHLORIDE 20 MG/ML
INJECTION, SOLUTION INFILTRATION; PERINEURAL AS NEEDED
Status: DISCONTINUED | OUTPATIENT
Start: 2021-09-20 | End: 2021-09-20 | Stop reason: SURG

## 2021-09-20 RX ORDER — SODIUM CHLORIDE, SODIUM LACTATE, POTASSIUM CHLORIDE, CALCIUM CHLORIDE 600; 310; 30; 20 MG/100ML; MG/100ML; MG/100ML; MG/100ML
30 INJECTION, SOLUTION INTRAVENOUS CONTINUOUS PRN
Status: DISCONTINUED | OUTPATIENT
Start: 2021-09-20 | End: 2021-09-20 | Stop reason: HOSPADM

## 2021-09-20 RX ORDER — SODIUM CHLORIDE 0.9 % (FLUSH) 0.9 %
3 SYRINGE (ML) INJECTION EVERY 12 HOURS SCHEDULED
Status: DISCONTINUED | OUTPATIENT
Start: 2021-09-20 | End: 2021-09-20 | Stop reason: HOSPADM

## 2021-09-20 RX ORDER — MAGNESIUM HYDROXIDE 1200 MG/15ML
LIQUID ORAL AS NEEDED
Status: DISCONTINUED | OUTPATIENT
Start: 2021-09-20 | End: 2021-09-20 | Stop reason: HOSPADM

## 2021-09-20 RX ADMIN — PROPOFOL 200 MCG/KG/MIN: 10 INJECTION, EMULSION INTRAVENOUS at 08:26

## 2021-09-20 RX ADMIN — SODIUM CHLORIDE, POTASSIUM CHLORIDE, SODIUM LACTATE AND CALCIUM CHLORIDE 30 ML/HR: 600; 310; 30; 20 INJECTION, SOLUTION INTRAVENOUS at 07:46

## 2021-09-20 RX ADMIN — PROPOFOL 80 MG: 10 INJECTION, EMULSION INTRAVENOUS at 08:26

## 2021-09-20 RX ADMIN — LIDOCAINE HYDROCHLORIDE 60 MG: 20 INJECTION, SOLUTION INFILTRATION; PERINEURAL at 08:26

## 2021-09-20 NOTE — ANESTHESIA PREPROCEDURE EVALUATION
Anesthesia Evaluation                  Airway   Mallampati: II  TM distance: >3 FB  Neck ROM: full  no difficulty expected  Dental - normal exam     Pulmonary - normal exam   (+) asthma,  (-) decreased breath sounds, wheezes  Cardiovascular - normal exam    (+) hypertension, hyperlipidemia,       Neuro/Psych  (+) numbness,     GI/Hepatic/Renal/Endo    (+)  GERD,  diabetes mellitus,     Musculoskeletal     Abdominal  - normal exam   Substance History      OB/GYN          Other                      Anesthesia Plan    ASA 2     MAC     intravenous induction     Anesthetic plan, all risks, benefits, and alternatives have been provided, discussed and informed consent has been obtained with: patient.    Plan discussed with CRNA.

## 2021-09-20 NOTE — ANESTHESIA POSTPROCEDURE EVALUATION
"Patient: Kaushal Jacobs    Procedure Summary     Date: 09/20/21 Room / Location[facility]    Anesthesia Start: 0822 Anesthesia Stop: 0846    Procedure: COLONOSCOPY with polypectomy (N/A ) Diagnosis:       Encounter for screening for malignant neoplasm of colon      (Encounter for screening for malignant neoplasm of colon [Z12.11])    Surgeons: Tee Bassett MD Provider: Celestino Nath MD    Anesthesia Type: MAC ASA Status: 2          Anesthesia Type: MAC    Vitals  Vitals Value Taken Time   /76 09/20/21 0901   Temp 36.3 °C (97.3 °F) 09/20/21 0846   Pulse 77 09/20/21 0901   Resp 16 09/20/21 0901   SpO2 96 % 09/20/21 0901           Post Anesthesia Care and Evaluation    Patient location during evaluation: bedside  Patient participation: complete - patient participated  Level of consciousness: awake and alert  Pain management: adequate  Airway patency: patent  Anesthetic complications: No anesthetic complications    Cardiovascular status: acceptable  Respiratory status: acceptable  Hydration status: acceptable    Comments: /76 (BP Location: Left arm, Patient Position: Lying)   Pulse 77   Temp 36.3 °C (97.3 °F) (Infrared)   Resp 16   Ht 182.9 cm (72\")   Wt 92.5 kg (204 lb)   SpO2 96%   BMI 27.67 kg/m²       "

## 2021-09-20 NOTE — H&P
No chief complaint on file.      HPI  Patient today for screening colonoscopy.         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   • Encounter for screening for malignant neoplasm of colon   • Primary insomnia       Medical History:    Past Medical History:   Diagnosis Date   • Carpal tunnel syndrome    • GERD (gastroesophageal reflux disease)    • Insomnia         Social History:    Social History     Socioeconomic History   • Marital status: Single     Spouse name: Not on file   • Number of children: Not on file   • Years of education: Not on file   • Highest education level: Not on file   Tobacco Use   • Smoking status: Never Smoker   • Smokeless tobacco: Never Used   Substance and Sexual Activity   • Alcohol use: Yes     Comment: 2 drinks daily   • Drug use: No   • Sexual activity: Yes     Partners: Male     Birth control/protection: Condom     Comment: he uses condoms       Family History:   Family History   Problem Relation Age of Onset   • Hyperlipidemia Mother    • Hypertension Mother    • Diabetes Mother    • Alzheimer's disease Mother    • COPD Father    • Heart disease Father    • Hyperlipidemia Father    • Hypertension Father    • Liver disease Sister         fatty liver       Surgical History:   Past Surgical History:   Procedure Laterality Date   • ENDOSCOPY     • KNEE SURGERY Right 2000    ACL   • TONSILLECTOMY     • UMBILICAL HERNIA REPAIR         No current facility-administered medications for this encounter.    Allergies:   Allergies   Allergen Reactions   • Penicillins         The following portions of the patient's history were reviewed by me and updated as appropriate: review of systems, allergies, current medications, past family history, past medical history,  past social history, past surgical history and problem list.    There were no vitals filed for this visit.    PHYSICAL EXAM:    CONSTITUTIONAL:  today's vital signs reviewed by me  GASTROINTESTINAL: abdomen is soft nontender nondistended with normal active bowel sounds, no masses are appreciated    Assessment/ Plan  We will proceed today with screening colonoscopy.    Risks and benefits as well as alternatives to endoscopic evaluation were explained to the patient and they voiced understanding and wish to proceed.  These risks include but are not limited to the risk of bleeding, perforation, adverse reaction to sedation, and missed lesions.  The patient was given the opportunity to ask questions prior to the endoscopic procedure.

## 2021-09-21 LAB
LAB AP CASE REPORT: NORMAL
LAB AP CLINICAL INFORMATION: NORMAL
PATH REPORT.FINAL DX SPEC: NORMAL
PATH REPORT.GROSS SPEC: NORMAL

## 2021-10-01 ENCOUNTER — IMMUNIZATION (OUTPATIENT)
Dept: VACCINE CLINIC | Facility: HOSPITAL | Age: 46
End: 2021-10-01

## 2021-10-01 PROCEDURE — 0003A: CPT | Performed by: INTERNAL MEDICINE

## 2021-10-01 PROCEDURE — 91300 HC SARSCOV02 VAC 30MCG/0.3ML IM: CPT | Performed by: INTERNAL MEDICINE

## 2021-10-01 PROCEDURE — 0004A ADM SARSCOV2 30MCG/0.3ML BOOSTER: CPT | Performed by: INTERNAL MEDICINE

## 2021-10-07 DIAGNOSIS — E11.9 TYPE 2 DIABETES MELLITUS WITHOUT COMPLICATION, WITHOUT LONG-TERM CURRENT USE OF INSULIN (HCC): Chronic | ICD-10-CM

## 2021-10-09 LAB
ALBUMIN SERPL-MCNC: 4.8 G/DL (ref 3.5–5.2)
ALBUMIN/GLOB SERPL: 2.4 G/DL
ALP SERPL-CCNC: 40 U/L (ref 39–117)
ALT SERPL-CCNC: 30 U/L (ref 1–41)
AST SERPL-CCNC: 19 U/L (ref 1–40)
BILIRUB SERPL-MCNC: 0.5 MG/DL (ref 0–1.2)
BUN SERPL-MCNC: 13 MG/DL (ref 6–20)
BUN/CREAT SERPL: 17.8 (ref 7–25)
CALCIUM SERPL-MCNC: 9.5 MG/DL (ref 8.6–10.5)
CHLORIDE SERPL-SCNC: 101 MMOL/L (ref 98–107)
CO2 SERPL-SCNC: 21.7 MMOL/L (ref 22–29)
CREAT SERPL-MCNC: 0.73 MG/DL (ref 0.76–1.27)
GLOBULIN SER CALC-MCNC: 2 GM/DL
GLUCOSE SERPL-MCNC: 118 MG/DL (ref 65–99)
HBA1C MFR BLD: 5.9 % (ref 4.8–5.6)
POTASSIUM SERPL-SCNC: 4.3 MMOL/L (ref 3.5–5.2)
PROT SERPL-MCNC: 6.8 G/DL (ref 6–8.5)
SODIUM SERPL-SCNC: 136 MMOL/L (ref 136–145)

## 2021-10-15 ENCOUNTER — OFFICE VISIT (OUTPATIENT)
Dept: INTERNAL MEDICINE | Facility: CLINIC | Age: 46
End: 2021-10-15

## 2021-10-15 VITALS
HEIGHT: 72 IN | SYSTOLIC BLOOD PRESSURE: 128 MMHG | TEMPERATURE: 97.5 F | BODY MASS INDEX: 28.47 KG/M2 | DIASTOLIC BLOOD PRESSURE: 76 MMHG | WEIGHT: 210.2 LBS

## 2021-10-15 DIAGNOSIS — D36.9 TUBULAR ADENOMA: ICD-10-CM

## 2021-10-15 DIAGNOSIS — E11.9 TYPE 2 DIABETES MELLITUS WITHOUT COMPLICATION, WITHOUT LONG-TERM CURRENT USE OF INSULIN (HCC): Chronic | ICD-10-CM

## 2021-10-15 DIAGNOSIS — Z12.5 SCREENING FOR PROSTATE CANCER: ICD-10-CM

## 2021-10-15 DIAGNOSIS — J45.20 MILD INTERMITTENT ASTHMA WITHOUT COMPLICATION: ICD-10-CM

## 2021-10-15 DIAGNOSIS — I10 ESSENTIAL HYPERTENSION: Chronic | ICD-10-CM

## 2021-10-15 DIAGNOSIS — Z23 NEED FOR INFLUENZA VACCINATION: Primary | ICD-10-CM

## 2021-10-15 DIAGNOSIS — Z00.00 WELLNESS EXAMINATION: ICD-10-CM

## 2021-10-15 PROCEDURE — 90686 IIV4 VACC NO PRSV 0.5 ML IM: CPT | Performed by: INTERNAL MEDICINE

## 2021-10-15 PROCEDURE — 99214 OFFICE O/P EST MOD 30 MIN: CPT | Performed by: INTERNAL MEDICINE

## 2021-10-15 PROCEDURE — 90471 IMMUNIZATION ADMIN: CPT | Performed by: INTERNAL MEDICINE

## 2021-10-15 NOTE — PROGRESS NOTES
Subjective        Chief Complaint   Patient presents with   • Hypertension           Kaushal Jacobs is a 45 y.o. 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 He has not been checking his sugars or Bp. He had one polyp in his colon.  Allergies   Allergen Reactions   • Penicillins        Current Outpatient Medications on File Prior to Visit   Medication Sig Dispense Refill   • ALBUTEROL SULFATE  (90 Base) MCG/ACT inhaler INHALE 2 PUFFS BY MOUTH EVERY 4 HOURS AS NEEDED 20.1 inhaler 0   • atorvastatin (LIPITOR) 20 MG tablet TAKE 1 TABLET BY MOUTH EVERY DAY 90 tablet 3   • lisinopril (PRINIVIL,ZESTRIL) 20 MG tablet TAKE 1 TABLET BY MOUTH EVERY DAY 90 tablet 1   • metFORMIN (GLUCOPHAGE) 1000 MG tablet TAKE 1 TABLET BY MOUTH TWICE A  tablet 1   • omeprazole (priLOSEC) 20 MG capsule TAKE 1 CAPSULE BY MOUTH EVERY DAY 90 capsule 1   • Symbicort 80-4.5 MCG/ACT inhaler INHALE 2 PUFFS BY MOUTH TWICE A DAY 30.6 inhaler 0   • zolpidem (AMBIEN) 10 MG tablet Take 1 tablet by mouth At Night As Needed for Sleep. 30 tablet 3   • [DISCONTINUED] atorvastatin (LIPITOR) 20 MG tablet TAKE 1 TABLET BY MOUTH EVERY DAY 90 tablet 3   • [DISCONTINUED] omeprazole (priLOSEC) 20 MG capsule TAKE 1 CAPSULE BY MOUTH EVERY DAY 90 capsule 1     No current facility-administered medications on file prior to visit.       Past Medical History:   Diagnosis Date   • Carpal tunnel syndrome    • GERD (gastroesophageal reflux disease)    • Insomnia        Past Surgical History:   Procedure Laterality Date   • COLONOSCOPY N/A 9/20/2021    Procedure: COLONOSCOPY with polypectomy;  Surgeon: Serjio  "Tee LI MD;  [place] [facility] [place]  Service: Gastroenterology;  Laterality: N/A;  polyp, diverticulosis   • ENDOSCOPY     • KNEE SURGERY Right 2000    ACL   • TONSILLECTOMY     • UMBILICAL HERNIA REPAIR         Family History   Problem Relation Age of Onset   • Hyperlipidemia Mother    • Hypertension Mother    • Diabetes Mother    • Alzheimer's disease Mother    • COPD Father    • Heart disease Father    • Hyperlipidemia Father    • Hypertension Father    • Liver disease Sister         fatty liver       Social History     Socioeconomic History   • Marital status: Single   Tobacco Use   • Smoking status: Never Smoker   • Smokeless tobacco: Never Used   Substance and Sexual Activity   • Alcohol use: Yes     Comment: 2 drinks daily   • Drug use: No   • Sexual activity: Yes     Partners: Male     Birth control/protection: Condom     Comment: he uses condoms           The following portions of the patient's history were reviewed and updated as appropriate: problem list, allergies, current medications, past medical history, past family history, past social history and past surgical history.    Review of Systems    Immunization History   Administered Date(s) Administered   • COVID-19 (PFIZER) 02/28/2021, 03/21/2021, 10/01/2021   • Flu Vaccine Quad PF >18YRS 09/16/2016, 11/20/2018   • FluLaval/Fluarix/Fluzone >6 09/24/2020, 10/15/2021   • Hepatitis A 01/01/2011   • Hepatitis B 01/01/2011   • Pneumococcal Polysaccharide (PPSV23) 09/24/2020   • Tdap 01/01/2009, 06/10/2019   • Typhoid, Unspecified 01/01/2016       Objective   Vitals:    10/15/21 1659   BP: 128/76   Temp: 97.5 °F (36.4 °C)   Weight: 95.3 kg (210 lb 3.2 oz)   Height: 182.9 cm (72\")     Body mass index is 28.51 kg/m².  Physical Exam  Vitals reviewed.   Constitutional:       Appearance: He is well-developed.   HENT:      Head: Normocephalic and atraumatic.   Cardiovascular:      Rate and Rhythm: Normal rate and regular rhythm.      Heart sounds: Normal " heart sounds, S1 normal and S2 normal.   Pulmonary:      Effort: Pulmonary effort is normal.      Breath sounds: Normal breath sounds.   Skin:     General: Skin is warm.   Neurological:      Mental Status: He is alert.   Psychiatric:         Behavior: Behavior normal.         Procedures    Assessment/Plan   Diagnoses and all orders for this visit:    1. Need for influenza vaccination (Primary)  -     FluLaval/Fluarix/Fluzone >6 Months (7913-0588)    2. Essential hypertension  -     Cancel: Lipid Panel With / Chol / HDL Ratio; Future  -     Lipid Panel With / Chol / HDL Ratio; Future    3. Type 2 diabetes mellitus without complication, without long-term current use of insulin (HCC)  -     Cancel: Comprehensive Metabolic Panel; Future  -     Cancel: Hemoglobin A1c; Future  -     Cancel: Microalbumin / Creatinine Urine Ratio - Urine, Clean Catch; Future  -     Comprehensive Metabolic Panel; Future  -     Hemoglobin A1c; Future  -     Microalbumin / Creatinine Urine Ratio - Urine, Clean Catch; Future    4. Tubular adenoma  Comments:  Repeat cscope in 5 years.    5. Mild intermittent asthma without complication    6. Screening for prostate cancer  -     Cancel: PSA Screen; Future  -     PSA Screen; Future    7. Wellness examination  -     HIV-1 / O / 2 Ag / Antibody 4th Generation; Future  -     RPR; Future  -     Chlamydia trachomatis, Neisseria gonorrhoeae, PCR - , Urine, Clean Catch; Future               Flu shot. BP and breathing are good. A1c is at goal. Reviewed cscope.  Return in about 6 months (around 4/15/2022) for Annual physical, Lab Before FUP.

## 2021-11-10 DIAGNOSIS — J45.20 MILD INTERMITTENT ASTHMA WITHOUT COMPLICATION: ICD-10-CM

## 2021-11-10 RX ORDER — DILTIAZEM HYDROCHLORIDE 60 MG/1
TABLET, FILM COATED ORAL
Qty: 10.2 EACH | Refills: 3 | Status: SHIPPED | OUTPATIENT
Start: 2021-11-10

## 2021-12-18 DIAGNOSIS — R10.13 DYSPEPSIA: ICD-10-CM

## 2021-12-18 DIAGNOSIS — I10 ESSENTIAL HYPERTENSION: Chronic | ICD-10-CM

## 2021-12-18 DIAGNOSIS — E11.9 TYPE 2 DIABETES MELLITUS WITHOUT COMPLICATION, WITHOUT LONG-TERM CURRENT USE OF INSULIN (HCC): ICD-10-CM

## 2021-12-20 RX ORDER — LISINOPRIL 20 MG/1
TABLET ORAL
Qty: 90 TABLET | Refills: 1 | Status: SHIPPED | OUTPATIENT
Start: 2021-12-20 | End: 2022-07-28

## 2021-12-20 RX ORDER — OMEPRAZOLE 20 MG/1
CAPSULE, DELAYED RELEASE ORAL
Qty: 90 CAPSULE | Refills: 1 | Status: SHIPPED | OUTPATIENT
Start: 2021-12-20 | End: 2022-07-26

## 2022-01-04 ENCOUNTER — OFFICE VISIT (OUTPATIENT)
Dept: INTERNAL MEDICINE | Facility: CLINIC | Age: 47
End: 2022-01-04

## 2022-01-04 VITALS
OXYGEN SATURATION: 98 % | HEART RATE: 97 BPM | SYSTOLIC BLOOD PRESSURE: 126 MMHG | TEMPERATURE: 98.9 F | DIASTOLIC BLOOD PRESSURE: 80 MMHG

## 2022-01-04 DIAGNOSIS — R05.9 COUGH: Primary | ICD-10-CM

## 2022-01-04 DIAGNOSIS — J45.901 MILD ASTHMA WITH EXACERBATION, UNSPECIFIED WHETHER PERSISTENT: ICD-10-CM

## 2022-01-04 PROCEDURE — 99213 OFFICE O/P EST LOW 20 MIN: CPT | Performed by: INTERNAL MEDICINE

## 2022-01-04 RX ORDER — METHYLPREDNISOLONE 4 MG/1
TABLET ORAL
Qty: 21 EACH | Refills: 0 | Status: SHIPPED | OUTPATIENT
Start: 2022-01-04 | End: 2022-09-06

## 2022-01-04 NOTE — PROGRESS NOTES
Subjective        Chief Complaint   Patient presents with   • Cough           Kaushal Jacobs is a 46 y.o. 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   • Primary insomnia   • Tubular adenoma       History of Present Illness       Allergies   Allergen Reactions   • Penicillins        Current Outpatient Medications on File Prior to Visit   Medication Sig Dispense Refill   • ALBUTEROL SULFATE  (90 Base) MCG/ACT inhaler INHALE 2 PUFFS BY MOUTH EVERY 4 HOURS AS NEEDED 20.1 inhaler 0   • atorvastatin (LIPITOR) 20 MG tablet TAKE 1 TABLET BY MOUTH EVERY DAY 90 tablet 3   • lisinopril (PRINIVIL,ZESTRIL) 20 MG tablet TAKE 1 TABLET BY MOUTH EVERY DAY 90 tablet 1   • metFORMIN (GLUCOPHAGE) 1000 MG tablet TAKE 1 TABLET BY MOUTH TWICE A  tablet 1   • omeprazole (priLOSEC) 20 MG capsule TAKE 1 CAPSULE BY MOUTH EVERY DAY 90 capsule 1   • Symbicort 80-4.5 MCG/ACT inhaler INHALE 2 PUFFS BY MOUTH TWICE A DAY 10.2 each 3   • zolpidem (AMBIEN) 10 MG tablet Take 1 tablet by mouth At Night As Needed for Sleep. 30 tablet 3     No current facility-administered medications on file prior to visit.       Past Medical History:   Diagnosis Date   • Carpal tunnel syndrome    • GERD (gastroesophageal reflux disease)    • Insomnia        Past Surgical History:   Procedure Laterality Date   • COLONOSCOPY N/A 9/20/2021    Procedure: COLONOSCOPY with polypectomy;  Surgeon: Tee Bassett MD;  [place] [facility] [place]  Service: Gastroenterology;  Laterality: N/A;  polyp, diverticulosis   • ENDOSCOPY     • KNEE SURGERY Right 2000    ACL   • TONSILLECTOMY     • UMBILICAL HERNIA REPAIR         Family History   Problem Relation Age of Onset   • Hyperlipidemia Mother    • Hypertension Mother    • Diabetes  "Mother    • Alzheimer's disease Mother    • COPD Father    • Heart disease Father    • Hyperlipidemia Father    • Hypertension Father    • Liver disease Sister         fatty liver       Social History     Socioeconomic History   • Marital status: Single   Tobacco Use   • Smoking status: Never Smoker   • Smokeless tobacco: Never Used   Substance and Sexual Activity   • Alcohol use: Yes     Comment: 2 drinks daily   • Drug use: No   • Sexual activity: Yes     Partners: Male     Birth control/protection: Condom     Comment: he uses condoms           The following portions of the patient's history were reviewed and updated as appropriate: {history reviewed:06195::\"problem list\",\"allergies\",\"current medications\",\"past medical history\",\"past family history\",\"past social history\",\"past surgical history\"}.    Review of Systems    Immunization History   Administered Date(s) Administered   • COVID-19 (PFIZER) 02/28/2021, 03/21/2021, 10/01/2021   • Flu Vaccine Quad PF >18YRS 09/16/2016, 11/20/2018   • FluLaval/Fluarix/Fluzone >6 09/24/2020, 10/15/2021   • Hepatitis A 01/01/2011   • Hepatitis B 01/01/2011   • Pneumococcal Polysaccharide (PPSV23) 09/24/2020   • Tdap 01/01/2009, 06/10/2019   • Typhoid, Unspecified 01/01/2016       Objective   There were no vitals filed for this visit.  There is no height or weight on file to calculate BMI.  Physical Exam    Procedures    Assessment/Plan   {Assess/PlanSmartLinks:01179}             No follow-ups on file.  "

## 2022-01-04 NOTE — PROGRESS NOTES
Subjective        Chief Complaint   Patient presents with   • Cough           Kaushal Jacobs is a 46 y.o. 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 He started wheezing last Thursday. He was wheezing and had a negative COVID test on Friday. He has been using Mucinex. He thinks he is getting better. He denies fever. He has a slight cough that is improving. It was yellow and it is getting clearer.   Allergies   Allergen Reactions   • Penicillins        Current Outpatient Medications on File Prior to Visit   Medication Sig Dispense Refill   • ALBUTEROL SULFATE  (90 Base) MCG/ACT inhaler INHALE 2 PUFFS BY MOUTH EVERY 4 HOURS AS NEEDED 20.1 inhaler 0   • atorvastatin (LIPITOR) 20 MG tablet TAKE 1 TABLET BY MOUTH EVERY DAY 90 tablet 3   • lisinopril (PRINIVIL,ZESTRIL) 20 MG tablet TAKE 1 TABLET BY MOUTH EVERY DAY 90 tablet 1   • metFORMIN (GLUCOPHAGE) 1000 MG tablet TAKE 1 TABLET BY MOUTH TWICE A  tablet 1   • omeprazole (priLOSEC) 20 MG capsule TAKE 1 CAPSULE BY MOUTH EVERY DAY 90 capsule 1   • Symbicort 80-4.5 MCG/ACT inhaler INHALE 2 PUFFS BY MOUTH TWICE A DAY 10.2 each 3   • zolpidem (AMBIEN) 10 MG tablet Take 1 tablet by mouth At Night As Needed for Sleep. 30 tablet 3     No current facility-administered medications on file prior to visit.       Past Medical History:   Diagnosis Date   • Carpal tunnel syndrome    • GERD (gastroesophageal reflux disease)    • Insomnia        Past Surgical History:   Procedure Laterality Date   • COLONOSCOPY N/A 9/20/2021    Procedure: COLONOSCOPY with polypectomy;  Surgeon: Tee Bassett MD;  [place] [facility] [place];   Service: Gastroenterology;  Laterality: N/A;  polyp, diverticulosis   • ENDOSCOPY     • KNEE SURGERY Right 2000    ACL   • TONSILLECTOMY     • UMBILICAL HERNIA REPAIR         Family History   Problem Relation Age of Onset   • Hyperlipidemia Mother    • Hypertension Mother    • Diabetes Mother    • Alzheimer's disease Mother    • COPD Father    • Heart disease Father    • Hyperlipidemia Father    • Hypertension Father    • Liver disease Sister         fatty liver       Social History     Socioeconomic History   • Marital status: Single   Tobacco Use   • Smoking status: Never Smoker   • Smokeless tobacco: Never Used   Substance and Sexual Activity   • Alcohol use: Yes     Comment: 2 drinks daily   • Drug use: No   • Sexual activity: Yes     Partners: Male     Birth control/protection: Condom     Comment: he uses condoms           The following portions of the patient's history were reviewed and updated as appropriate: problem list, allergies, current medications, past medical history, past family history, past social history and past surgical history.    Review of Systems    Immunization History   Administered Date(s) Administered   • COVID-19 (PFIZER) 02/28/2021, 03/21/2021, 10/01/2021   • Flu Vaccine Quad PF >18YRS 09/16/2016, 11/20/2018   • FluLaval/Fluarix/Fluzone >6 09/24/2020, 10/15/2021   • Hepatitis A 01/01/2011   • Hepatitis B 01/01/2011   • Pneumococcal Polysaccharide (PPSV23) 09/24/2020   • Tdap 01/01/2009, 06/10/2019   • Typhoid, Unspecified 01/01/2016       Objective   Vitals:    01/04/22 1612   BP: 126/80   Pulse: 97   Temp: 98.9 °F (37.2 °C)   SpO2: 98%     There is no height or weight on file to calculate BMI.  Physical Exam  Vitals reviewed.   Constitutional:       Appearance: He is well-developed.   HENT:      Head: Normocephalic and atraumatic.   Cardiovascular:      Rate and Rhythm: Normal rate and regular rhythm.      Heart sounds: Normal heart sounds, S1 normal and S2 normal.   Pulmonary:       Effort: Pulmonary effort is normal.      Comments: Expiratory wheeze 2/3 up bilaterally.  Skin:     General: Skin is warm.   Neurological:      Mental Status: He is alert.   Psychiatric:         Behavior: Behavior normal.         Procedures    Assessment/Plan   Diagnoses and all orders for this visit:    1. Cough (Primary)  -     COVID-19,[facility] ROUTINE, NP/OP SWAB IN TRANSPORT MEDIA OR ESWAB 72 HR TAT - Swab, Nasopharynx    2. Mild asthma with exacerbation, unspecified whether persistent  -     methylPREDNISolone (MEDROL) 4 MG dose pack; Take as directed on package instructions.  Dispense: 21 each; Refill: 0             I think asthma related. No need for abx since sputum improved. Swab for COVID to be safe. Discussed quarantine.    No follow-ups on file.

## 2022-01-06 LAB
LABCORP SARS-COV-2, NAA 2 DAY TAT: NORMAL
SARS-COV-2 RNA RESP QL NAA+PROBE: NOT DETECTED

## 2022-01-20 DIAGNOSIS — F51.01 PRIMARY INSOMNIA: ICD-10-CM

## 2022-01-20 RX ORDER — ZOLPIDEM TARTRATE 10 MG/1
10 TABLET ORAL NIGHTLY PRN
Qty: 30 TABLET | Refills: 3 | Status: SHIPPED | OUTPATIENT
Start: 2022-01-20

## 2022-02-18 ENCOUNTER — PROCEDURE VISIT (OUTPATIENT)
Dept: NEUROLOGY | Facility: CLINIC | Age: 47
End: 2022-02-18

## 2022-02-18 VITALS — WEIGHT: 210 LBS | HEIGHT: 72 IN | BODY MASS INDEX: 28.44 KG/M2

## 2022-02-18 DIAGNOSIS — R20.0 LEFT LEG NUMBNESS: ICD-10-CM

## 2022-02-18 PROCEDURE — 95909 NRV CNDJ TST 5-6 STUDIES: CPT | Performed by: PSYCHIATRY & NEUROLOGY

## 2022-02-18 PROCEDURE — 95886 MUSC TEST DONE W/N TEST COMP: CPT | Performed by: PSYCHIATRY & NEUROLOGY

## 2022-02-18 NOTE — PROGRESS NOTES
EMG and Nerve Conduction Studies    I.      Instrument used: Neuromax 1002  II.     Please see data sheets for tabular summary of NCS and details on methods, temperatures and lab standards.   III.    EMG muscles tested for upper extremity studies include the deltoid, biceps, triceps, pronator teres, extensor digitorum communis, first dorsal interosseous and abductor pollicis brevis.    IV.   EMG muscles tested for lower extremity studies include the vastus lateralis, tibialis anterior, peroneus longus, medial gastrocnemius and extensor digitorum brevis.    V.    Additional muscles tested as needed.  Paraspinal muscles tested as needed.   VI.   Please see data sheets for tabular summary of EMG findings.   VII. The complete report includes the data sheets.      Indication: Episodic tingling in the left lateral lower extremity  History: 46-year-old male with episodic tingling in the left lateral lower extremity beginning above the knee radiating down into the foot.  It is sometimes on the bottom of the foot also.  No symptoms on the right.  Occasional back pain but not anything very significant he states.  Positive history of diabetes for 3 or 4 years which is well controlled.      Ht: 182.9 cm 95.3 kg; BMI 28.48  Wt:   HbA1C:   Lab Results   Component Value Date    HGBA1C 5.90 (H) 10/08/2021     TSH: No results found for: TSH    Technical summary:  Nerve conduction studies were obtained in the left leg.  Skin temperatures were initially cold and so the foot was warmed.  Temperatures were then at least 32 °C mostly.  It dipped below that but temperature correction was not needed.  Needle examination was obtained on selected muscles in the left leg.    Results:  1.  Normal left sural sensory distal latency and amplitude.  2.  Normal left superficial peroneal sensory distal latency and amplitude.  3.  Normal left saphenous distal latency and amplitude.  4.  Normal left peroneal motor conduction velocities, distal latency  and amplitudes.  5.  Normal left tibial motor conduction velocity, distal latency and amplitudes.  6.  Needle examination of selected muscles of the left leg showed normal insertional activities throughout.  There were normal motor units and recruitment patterns throughout.  Lumbar paraspinals at L5 showed no abnormality.    Impression:  Normal study.  No evidence of peripheral neuropathy or a left lumbosacral radiculopathy by this study.  Study results were discussed with the patient.    Camilo Zuniga M.D.              Dictated utilizing Dragon dictation.

## 2022-03-03 ENCOUNTER — TELEPHONE (OUTPATIENT)
Dept: INTERNAL MEDICINE | Facility: CLINIC | Age: 47
End: 2022-03-03

## 2022-03-03 NOTE — TELEPHONE ENCOUNTER
Hub staff attempted to follow warm transfer process and was unsuccessful     Caller: Kaushal Jacobs    Relationship to patient: Self    Best call back number: [phone] (M)    Patient is needing: A SAME DAY APPT 03/03/2022 OR APPT TOMORROW 03/04/2022 FOR A VERY PAINFUL STYE IN LEFT EYE, HUB WAS UNABLE TO FIND ANYTHING ON THE SCHEDULE, PLEASE CALL PATIENT BACK ASAP

## 2022-03-04 ENCOUNTER — OFFICE VISIT (OUTPATIENT)
Dept: INTERNAL MEDICINE | Facility: CLINIC | Age: 47
End: 2022-03-04

## 2022-03-04 VITALS — BODY MASS INDEX: 28.58 KG/M2 | TEMPERATURE: 98 F | WEIGHT: 211 LBS | HEIGHT: 72 IN

## 2022-03-04 DIAGNOSIS — H00.015 HORDEOLUM EXTERNUM OF LEFT LOWER EYELID: Primary | ICD-10-CM

## 2022-03-04 PROCEDURE — 99213 OFFICE O/P EST LOW 20 MIN: CPT | Performed by: INTERNAL MEDICINE

## 2022-03-04 RX ORDER — ERYTHROMYCIN 5 MG/G
OINTMENT OPHTHALMIC DAILY
Qty: 1 G | Refills: 1 | Status: SHIPPED | OUTPATIENT
Start: 2022-03-04 | End: 2022-09-06

## 2022-03-04 NOTE — PROGRESS NOTES
Subjective        Chief Complaint   Patient presents with   • Guanakito Jacobs is a 46 y.o. 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 It did hurt. He has been using warm compresses. He did notice some drainage and crusting last night. He denies vision changes or pain in the eye.  Allergies   Allergen Reactions   • Penicillins        Current Outpatient Medications on File Prior to Visit   Medication Sig Dispense Refill   • ALBUTEROL SULFATE  (90 Base) MCG/ACT inhaler INHALE 2 PUFFS BY MOUTH EVERY 4 HOURS AS NEEDED 20.1 inhaler 0   • atorvastatin (LIPITOR) 20 MG tablet TAKE 1 TABLET BY MOUTH EVERY DAY 90 tablet 3   • lisinopril (PRINIVIL,ZESTRIL) 20 MG tablet TAKE 1 TABLET BY MOUTH EVERY DAY 90 tablet 1   • metFORMIN (GLUCOPHAGE) 1000 MG tablet TAKE 1 TABLET BY MOUTH TWICE A  tablet 1   • omeprazole (priLOSEC) 20 MG capsule TAKE 1 CAPSULE BY MOUTH EVERY DAY 90 capsule 1   • Symbicort 80-4.5 MCG/ACT inhaler INHALE 2 PUFFS BY MOUTH TWICE A DAY 10.2 each 3   • zolpidem (AMBIEN) 10 MG tablet TAKE 1 TABLET BY MOUTH AT NIGHT AS NEEDED FOR SLEEP. 30 tablet 3   • methylPREDNISolone (MEDROL) 4 MG dose pack Take as directed on package instructions. 21 each 0     No current facility-administered medications on file prior to visit.       Past Medical History:   Diagnosis Date   • Carpal tunnel syndrome    • GERD (gastroesophageal reflux disease)    • Insomnia        Past Surgical History:   Procedure Laterality Date   • COLONOSCOPY N/A 9/20/2021    Procedure: COLONOSCOPY with polypectomy;  Surgeon: Tee Bassett MD;   [place] [facility] [place]  Service: Gastroenterology;  Laterality: N/A;  polyp, diverticulosis   • ENDOSCOPY     • KNEE SURGERY Right 2000    ACL   • TONSILLECTOMY     • UMBILICAL HERNIA REPAIR         Family History   Problem Relation Age of Onset   • Hyperlipidemia Mother    • Hypertension Mother    • Diabetes Mother    • Alzheimer's disease Mother    • COPD Father    • Heart disease Father    • Hyperlipidemia Father    • Hypertension Father    • Liver disease Sister         fatty liver       Social History     Socioeconomic History   • Marital status: Single   Tobacco Use   • Smoking status: Never Smoker   • Smokeless tobacco: Never Used   Substance and Sexual Activity   • Alcohol use: Yes     Comment: 2 drinks daily   • Drug use: No   • Sexual activity: Yes     Partners: Male     Birth control/protection: Condom     Comment: he uses condoms           The following portions of the patient's history were reviewed and updated as appropriate: problem list, allergies, current medications, past medical history, past family history, past social history and past surgical history.    Review of Systems    Immunization History   Administered Date(s) Administered   • COVID-19 (PFIZER) PURPLE CAP 02/28/2021, 03/21/2021, 10/01/2021   • Flu Vaccine Quad PF >18YRS 09/16/2016, 11/20/2018   • FluLaval/Fluarix/Fluzone >6 09/24/2020, 10/15/2021   • Hepatitis A 01/01/2011   • Hepatitis B 01/01/2011   • Pneumococcal Polysaccharide (PPSV23) 09/24/2020   • Tdap 01/01/2009, 06/10/2019   • Typhoid, Unspecified 01/01/2016       Objective   Vitals:    03/04/22 1135   Temp: 98 °F (36.7 °C)   Weight: 95.7 kg (211 lb)   Height: 182.9 cm (72.01\")     Body mass index is 28.61 kg/m².  Physical Exam  Vitals reviewed.   Eyes:      General: Lids are normal.      Extraocular Movements: Extraocular movements intact.      Comments: Lateral corner of the left eye with redness. Inside left lower eyelid with tiny bump   Neurological:      Mental Status: " He is alert and oriented to person, place, and time.         Procedures    Assessment/Plan   Diagnoses and all orders for this visit:    1. Hordeolum externum of left lower eyelid (Primary)  -     erythromycin (ROMYCIN) 5 MG/GM ophthalmic ointment; Administer  into the left eye Daily.  Dispense: 1 g; Refill: 1             Continue warm compresses. Says getting better.    No follow-ups on file.

## 2022-05-03 RX ORDER — ATORVASTATIN CALCIUM 20 MG/1
20 TABLET, FILM COATED ORAL DAILY
Qty: 90 TABLET | Refills: 3 | Status: SHIPPED | OUTPATIENT
Start: 2022-05-03 | End: 2023-03-20 | Stop reason: SDUPTHER

## 2022-05-03 RX ORDER — ATORVASTATIN CALCIUM 20 MG/1
TABLET, FILM COATED ORAL
Qty: 90 TABLET | Refills: 3 | OUTPATIENT
Start: 2022-05-03

## 2022-07-25 DIAGNOSIS — E11.9 TYPE 2 DIABETES MELLITUS WITHOUT COMPLICATION, WITHOUT LONG-TERM CURRENT USE OF INSULIN: ICD-10-CM

## 2022-07-25 DIAGNOSIS — R10.13 DYSPEPSIA: ICD-10-CM

## 2022-07-26 RX ORDER — OMEPRAZOLE 20 MG/1
CAPSULE, DELAYED RELEASE ORAL
Qty: 60 CAPSULE | Refills: 0 | Status: SHIPPED | OUTPATIENT
Start: 2022-07-26 | End: 2022-09-27

## 2022-07-28 DIAGNOSIS — I10 ESSENTIAL HYPERTENSION: Chronic | ICD-10-CM

## 2022-07-28 RX ORDER — LISINOPRIL 20 MG/1
TABLET ORAL
Qty: 45 TABLET | Refills: 0 | Status: SHIPPED | OUTPATIENT
Start: 2022-07-28 | End: 2022-08-29

## 2022-08-28 DIAGNOSIS — I10 ESSENTIAL HYPERTENSION: Chronic | ICD-10-CM

## 2022-08-29 RX ORDER — LISINOPRIL 20 MG/1
TABLET ORAL
Qty: 30 TABLET | Refills: 1 | Status: SHIPPED | OUTPATIENT
Start: 2022-08-29 | End: 2022-10-28

## 2022-09-01 DIAGNOSIS — J45.20 MILD INTERMITTENT ASTHMA WITHOUT COMPLICATION: ICD-10-CM

## 2022-09-02 RX ORDER — ALBUTEROL SULFATE 90 UG/1
AEROSOL, METERED RESPIRATORY (INHALATION)
Qty: 6.7 G | Refills: 0 | Status: SHIPPED | OUTPATIENT
Start: 2022-09-02

## 2022-09-06 ENCOUNTER — OFFICE VISIT (OUTPATIENT)
Dept: INTERNAL MEDICINE | Facility: CLINIC | Age: 47
End: 2022-09-06

## 2022-09-06 VITALS
SYSTOLIC BLOOD PRESSURE: 120 MMHG | HEIGHT: 72 IN | TEMPERATURE: 97.8 F | HEART RATE: 114 BPM | DIASTOLIC BLOOD PRESSURE: 78 MMHG | WEIGHT: 217 LBS | BODY MASS INDEX: 29.39 KG/M2

## 2022-09-06 DIAGNOSIS — Z00.00 WELLNESS EXAMINATION: Primary | ICD-10-CM

## 2022-09-06 DIAGNOSIS — R00.0 SINUS TACHYCARDIA: ICD-10-CM

## 2022-09-06 DIAGNOSIS — J45.20 MILD INTERMITTENT ASTHMA WITHOUT COMPLICATION: ICD-10-CM

## 2022-09-06 DIAGNOSIS — K76.0 FATTY LIVER: ICD-10-CM

## 2022-09-06 DIAGNOSIS — I10 ESSENTIAL HYPERTENSION: Chronic | ICD-10-CM

## 2022-09-06 DIAGNOSIS — E78.49 OTHER HYPERLIPIDEMIA: Chronic | ICD-10-CM

## 2022-09-06 DIAGNOSIS — E11.9 TYPE 2 DIABETES MELLITUS WITHOUT COMPLICATION, WITHOUT LONG-TERM CURRENT USE OF INSULIN: Chronic | ICD-10-CM

## 2022-09-06 PROCEDURE — 99396 PREV VISIT EST AGE 40-64: CPT | Performed by: INTERNAL MEDICINE

## 2022-09-06 PROCEDURE — 93000 ELECTROCARDIOGRAM COMPLETE: CPT | Performed by: INTERNAL MEDICINE

## 2022-09-06 NOTE — PROGRESS NOTES
Subjective        Chief Complaint   Patient presents with   • Annual Exam           Kaushal Jacobs is a 46 y.o. 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 He has not been exercising. He is not wheezing as long as he takes his symbicort. He does use condoms. He denies any tachy or palpitations. He only has one cup of coffee per day.  Allergies   Allergen Reactions   • Penicillins        Current Outpatient Medications on File Prior to Visit   Medication Sig Dispense Refill   • albuterol sulfate  (90 Base) MCG/ACT inhaler TAKE 2 PUFFS BY MOUTH EVERY 4 HOURS AS NEEDED 6.7 g 0   • atorvastatin (LIPITOR) 20 MG tablet Take 1 tablet by mouth Daily. 90 tablet 3   • lisinopril (PRINIVIL,ZESTRIL) 20 MG tablet TAKE 1 TABLET BY MOUTH EVERY DAY 30 tablet 1   • metFORMIN (GLUCOPHAGE) 1000 MG tablet TAKE 1 TABLET BY MOUTH TWICE A  tablet 0   • omeprazole (priLOSEC) 20 MG capsule TAKE 1 CAPSULE BY MOUTH EVERY DAY 60 capsule 0   • Symbicort 80-4.5 MCG/ACT inhaler INHALE 2 PUFFS BY MOUTH TWICE A DAY 10.2 each 3   • zolpidem (AMBIEN) 10 MG tablet TAKE 1 TABLET BY MOUTH AT NIGHT AS NEEDED FOR SLEEP. 30 tablet 3   • [DISCONTINUED] erythromycin (ROMYCIN) 5 MG/GM ophthalmic ointment Administer  into the left eye Daily. 1 g 1   • [DISCONTINUED] methylPREDNISolone (MEDROL) 4 MG dose pack Take as directed on package instructions. 21 each 0     No current facility-administered medications on file prior to visit.       Past Medical History:   Diagnosis Date   • Carpal tunnel syndrome    • COVID-19 08/2022   • GERD (gastroesophageal reflux disease)    • Insomnia        Past  "Surgical History:   Procedure Laterality Date   • COLONOSCOPY N/A 9/20/2021    Procedure: COLONOSCOPY with polypectomy;  Surgeon: Tee Bassett MD;  Location: [facility] [place];  Service: Gastroenterology;  Laterality: N/A;  polyp, diverticulosis   • ENDOSCOPY     • KNEE SURGERY Right 2000    ACL   • TONSILLECTOMY     • UMBILICAL HERNIA REPAIR         Family History   Problem Relation Age of Onset   • Hyperlipidemia Mother    • Hypertension Mother    • Diabetes Mother    • Alzheimer's disease Mother    • COPD Father    • Heart disease Father    • Hyperlipidemia Father    • Hypertension Father    • Liver disease Sister         fatty liver       Social History     Socioeconomic History   • Marital status: Single   Tobacco Use   • Smoking status: Never Smoker   • Smokeless tobacco: Never Used   Substance and Sexual Activity   • Alcohol use: Yes     Comment: 2 drinks daily   • Drug use: No   • Sexual activity: Yes     Partners: Male     Birth control/protection: Condom     Comment: he uses condoms           The following portions of the patient's history were reviewed and updated as appropriate: problem list, allergies, current medications, past medical history, past family history, past social history and past surgical history.    Review of Systems    Immunization History   Administered Date(s) Administered   • COVID-19 (PFIZER) PURPLE CAP 02/28/2021, 03/21/2021, 10/01/2021   • FluLaval/Fluzone >6mos 09/24/2020, 10/15/2021   • Fluzone Quad >6mos (Multi-dose) 09/16/2016, 11/20/2018   • Hepatitis A 01/01/2011   • Hepatitis B 01/01/2011   • Pneumococcal Polysaccharide (PPSV23) 09/24/2020   • Tdap 01/01/2009, 06/10/2019   • Typhoid, Unspecified 01/01/2016       Objective   Vitals:    09/06/22 1336   BP: 120/78   Pulse: 114   Temp: 97.8 °F (36.6 °C)   Weight: 98.4 kg (217 lb)   Height: 182.9 cm (72.01\")     Body mass index is 29.42 kg/m².  Physical Exam  Vitals reviewed.   Constitutional:       Appearance: He is " well-developed.   HENT:      Head: Normocephalic and atraumatic.      Mouth/Throat:      Mouth: Mucous membranes are moist.      Pharynx: Oropharynx is clear.   Eyes:      Extraocular Movements: Extraocular movements intact.      Conjunctiva/sclera: Conjunctivae normal.      Pupils: Pupils are equal, round, and reactive to light.   Neck:      Thyroid: No thyromegaly.      Vascular: No carotid bruit.   Cardiovascular:      Rate and Rhythm: Normal rate and regular rhythm.      Heart sounds: Normal heart sounds. No murmur heard.  Pulmonary:      Effort: Pulmonary effort is normal.      Breath sounds: Normal breath sounds.   Abdominal:      General: There is no distension.      Palpations: Abdomen is soft. There is no mass.      Tenderness: There is no abdominal tenderness. There is no rebound.   Musculoskeletal:      Cervical back: Neck supple.   Lymphadenopathy:      Cervical: No cervical adenopathy.   Skin:     General: Skin is warm.   Neurological:      Mental Status: He is alert.   Psychiatric:         Behavior: Behavior normal.           ECG 12 Lead    Date/Time: 9/6/2022 3:02 PM  Performed by: Diallo Lawson MD  Authorized by: Diallo Lawson MD   Comparison: not compared with previous ECG   Rhythm: sinus tachycardia  Rate: tachycardic  Conduction: conduction normal  Q waves: III    QRS axis: normal    Clinical impression: abnormal EKG            Assessment & Plan   Diagnoses and all orders for this visit:    1. Wellness examination (Primary)    2. Fatty liver    3. Type 2 diabetes mellitus without complication, without long-term current use of insulin (HCC)  -     SITagliptin (Januvia) 100 MG tablet; Take 1 tablet by mouth Daily.  Dispense: 90 tablet; Refill: 1  -     Comprehensive Metabolic Panel; Future  -     Hemoglobin A1c; Future    4. Other hyperlipidemia    5. Essential hypertension    6. Mild intermittent asthma without complication  Comments:  Stable    7. Sinus tachycardia  -     CBC &  Differential  -     TSH  -     ECG 12 Lead               Reviewed labs. R/s Jae. Discussed eating healthier to lose wt.     Foot exam next time. Cscope is UTD. Recc exercise 150 minutes per week. Discussed Monkeypox vaccine and we also discussed safe sex. LDL and BP are at goal. HR is increased. Check TSH and CBC; if nl then I will get a holter.  Return in about 3 months (around 12/6/2022), or 30 minutes, for Lab Today, Lab Before FUP.

## 2022-09-07 LAB
BASOPHILS # BLD AUTO: 0.04 10*3/MM3 (ref 0–0.2)
BASOPHILS NFR BLD AUTO: 0.5 % (ref 0–1.5)
EOSINOPHIL # BLD AUTO: 0.11 10*3/MM3 (ref 0–0.4)
EOSINOPHIL NFR BLD AUTO: 1.4 % (ref 0.3–6.2)
ERYTHROCYTE [DISTWIDTH] IN BLOOD BY AUTOMATED COUNT: 13.2 % (ref 12.3–15.4)
HCT VFR BLD AUTO: 45 % (ref 37.5–51)
HGB BLD-MCNC: 15.3 G/DL (ref 13–17.7)
IMM GRANULOCYTES # BLD AUTO: 0.05 10*3/MM3 (ref 0–0.05)
IMM GRANULOCYTES NFR BLD AUTO: 0.6 % (ref 0–0.5)
LYMPHOCYTES # BLD AUTO: 1.82 10*3/MM3 (ref 0.7–3.1)
LYMPHOCYTES NFR BLD AUTO: 22.4 % (ref 19.6–45.3)
MCH RBC QN AUTO: 30.1 PG (ref 26.6–33)
MCHC RBC AUTO-ENTMCNC: 34 G/DL (ref 31.5–35.7)
MCV RBC AUTO: 88.6 FL (ref 79–97)
MONOCYTES # BLD AUTO: 0.74 10*3/MM3 (ref 0.1–0.9)
MONOCYTES NFR BLD AUTO: 9.1 % (ref 5–12)
NEUTROPHILS # BLD AUTO: 5.36 10*3/MM3 (ref 1.7–7)
NEUTROPHILS NFR BLD AUTO: 66 % (ref 42.7–76)
NRBC BLD AUTO-RTO: 0 /100 WBC (ref 0–0.2)
PLATELET # BLD AUTO: 300 10*3/MM3 (ref 140–450)
RBC # BLD AUTO: 5.08 10*6/MM3 (ref 4.14–5.8)
TSH SERPL DL<=0.005 MIU/L-ACNC: 1.18 UIU/ML (ref 0.27–4.2)
WBC # BLD AUTO: 8.12 10*3/MM3 (ref 3.4–10.8)

## 2022-09-08 ENCOUNTER — TELEPHONE (OUTPATIENT)
Dept: INTERNAL MEDICINE | Facility: CLINIC | Age: 47
End: 2022-09-08

## 2022-09-08 NOTE — TELEPHONE ENCOUNTER
Caller: Kaushal Jacobs    Relationship: Self    Best call back number: [phone]    Who are you requesting to speak with (clinical staff, provider,  specific staff member): CLINICAL STAFF     What was the call regarding: [facility] SENT A TEXT STATING THEY ARE GOING TO REACH OUT TO OFFICE FOR AN ALTERNATIVE MEDICATION FOR  SITagliptin (Januvia) 100 MG tablet  NOT SURE WHY PLEASE CALL AND ADVISE     Do you require a callback: YES

## 2022-09-12 DIAGNOSIS — E11.9 TYPE 2 DIABETES MELLITUS WITHOUT COMPLICATION, WITHOUT LONG-TERM CURRENT USE OF INSULIN: Primary | ICD-10-CM

## 2022-09-27 DIAGNOSIS — R10.13 DYSPEPSIA: ICD-10-CM

## 2022-09-27 DIAGNOSIS — E11.9 TYPE 2 DIABETES MELLITUS WITHOUT COMPLICATION, WITHOUT LONG-TERM CURRENT USE OF INSULIN: ICD-10-CM

## 2022-09-27 RX ORDER — OMEPRAZOLE 20 MG/1
CAPSULE, DELAYED RELEASE ORAL
Qty: 30 CAPSULE | Refills: 1 | Status: SHIPPED | OUTPATIENT
Start: 2022-09-27 | End: 2022-11-28

## 2022-10-28 DIAGNOSIS — I10 ESSENTIAL HYPERTENSION: Chronic | ICD-10-CM

## 2022-10-28 RX ORDER — LISINOPRIL 20 MG/1
TABLET ORAL
Qty: 30 TABLET | Refills: 1 | Status: SHIPPED | OUTPATIENT
Start: 2022-10-28 | End: 2022-12-06

## 2022-11-24 DIAGNOSIS — R10.13 DYSPEPSIA: ICD-10-CM

## 2022-11-24 DIAGNOSIS — E11.9 TYPE 2 DIABETES MELLITUS WITHOUT COMPLICATION, WITHOUT LONG-TERM CURRENT USE OF INSULIN: ICD-10-CM

## 2022-11-28 RX ORDER — OMEPRAZOLE 20 MG/1
CAPSULE, DELAYED RELEASE ORAL
Qty: 30 CAPSULE | Refills: 1 | Status: SHIPPED | OUTPATIENT
Start: 2022-11-28 | End: 2023-01-23

## 2022-11-30 DIAGNOSIS — E11.9 TYPE 2 DIABETES MELLITUS WITHOUT COMPLICATION, WITHOUT LONG-TERM CURRENT USE OF INSULIN: Chronic | ICD-10-CM

## 2022-12-01 LAB
ALBUMIN SERPL-MCNC: 4.5 G/DL (ref 3.5–5.2)
ALBUMIN/GLOB SERPL: 2.3 G/DL
ALP SERPL-CCNC: 58 U/L (ref 39–117)
ALT SERPL-CCNC: 48 U/L (ref 1–41)
AST SERPL-CCNC: 42 U/L (ref 1–40)
BILIRUB SERPL-MCNC: 0.3 MG/DL (ref 0–1.2)
BUN SERPL-MCNC: 12 MG/DL (ref 6–20)
BUN/CREAT SERPL: 16 (ref 7–25)
CALCIUM SERPL-MCNC: 9.3 MG/DL (ref 8.6–10.5)
CHLORIDE SERPL-SCNC: 103 MMOL/L (ref 98–107)
CO2 SERPL-SCNC: 20 MMOL/L (ref 22–29)
CREAT SERPL-MCNC: 0.75 MG/DL (ref 0.76–1.27)
EGFRCR SERPLBLD CKD-EPI 2021: 112 ML/MIN/1.73
GLOBULIN SER CALC-MCNC: 2 GM/DL
GLUCOSE SERPL-MCNC: 133 MG/DL (ref 65–99)
HBA1C MFR BLD: 7 % (ref 4.8–5.6)
POTASSIUM SERPL-SCNC: 4.7 MMOL/L (ref 3.5–5.2)
PROT SERPL-MCNC: 6.5 G/DL (ref 6–8.5)
SODIUM SERPL-SCNC: 138 MMOL/L (ref 136–145)

## 2022-12-06 ENCOUNTER — OFFICE VISIT (OUTPATIENT)
Dept: INTERNAL MEDICINE | Facility: CLINIC | Age: 47
End: 2022-12-06

## 2022-12-06 VITALS
TEMPERATURE: 96.9 F | WEIGHT: 222 LBS | HEART RATE: 94 BPM | DIASTOLIC BLOOD PRESSURE: 80 MMHG | OXYGEN SATURATION: 96 % | HEIGHT: 72 IN | BODY MASS INDEX: 30.07 KG/M2 | SYSTOLIC BLOOD PRESSURE: 134 MMHG

## 2022-12-06 DIAGNOSIS — K76.0 FATTY LIVER: ICD-10-CM

## 2022-12-06 DIAGNOSIS — N52.8 OTHER MALE ERECTILE DYSFUNCTION: ICD-10-CM

## 2022-12-06 DIAGNOSIS — E11.9 TYPE 2 DIABETES MELLITUS WITHOUT COMPLICATION, WITHOUT LONG-TERM CURRENT USE OF INSULIN: Chronic | ICD-10-CM

## 2022-12-06 DIAGNOSIS — J45.20 MILD INTERMITTENT ASTHMA, UNSPECIFIED WHETHER COMPLICATED: Primary | ICD-10-CM

## 2022-12-06 DIAGNOSIS — I10 ESSENTIAL HYPERTENSION: Chronic | ICD-10-CM

## 2022-12-06 PROBLEM — N52.9 ED (ERECTILE DYSFUNCTION): Status: ACTIVE | Noted: 2022-12-06

## 2022-12-06 PROBLEM — J45.909 ASTHMA: Chronic | Status: ACTIVE | Noted: 2019-06-10

## 2022-12-06 PROCEDURE — 90471 IMMUNIZATION ADMIN: CPT | Performed by: INTERNAL MEDICINE

## 2022-12-06 PROCEDURE — 90686 IIV4 VACC NO PRSV 0.5 ML IM: CPT | Performed by: INTERNAL MEDICINE

## 2022-12-06 PROCEDURE — 99214 OFFICE O/P EST MOD 30 MIN: CPT | Performed by: INTERNAL MEDICINE

## 2022-12-06 RX ORDER — SILDENAFIL CITRATE 20 MG/1
TABLET ORAL
Qty: 90 TABLET | Refills: 1 | Status: SHIPPED | OUTPATIENT
Start: 2022-12-06

## 2022-12-06 RX ORDER — LISINOPRIL 40 MG/1
40 TABLET ORAL DAILY
Qty: 90 TABLET | Refills: 2 | Status: SHIPPED | OUTPATIENT
Start: 2022-12-06 | End: 2023-03-20 | Stop reason: SDUPTHER

## 2022-12-06 NOTE — PROGRESS NOTES
Subjective        Chief Complaint   Patient presents with   • Diabetes   • Hypertension     3 mon f/u            Kaushal Jacobs is a 47 y.o. 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 He has needed to use the albuterol 2-3 times per week. He thinks the cold can affect his breathing. He does not exercise. He has not been checking his sugars. He says it is harder to maintain an erection and it is not as firm.  Allergies   Allergen Reactions   • Penicillins        Current Outpatient Medications on File Prior to Visit   Medication Sig Dispense Refill   • albuterol sulfate  (90 Base) MCG/ACT inhaler TAKE 2 PUFFS BY MOUTH EVERY 4 HOURS AS NEEDED 6.7 g 0   • atorvastatin (LIPITOR) 20 MG tablet Take 1 tablet by mouth Daily. 90 tablet 3   • linagliptin (Tradjenta) 5 MG tablet tablet Take 1 tablet by mouth Daily. 30 tablet 4   • metFORMIN (GLUCOPHAGE) 1000 MG tablet TAKE 1 TABLET BY MOUTH TWICE A DAY 60 tablet 1   • omeprazole (priLOSEC) 20 MG capsule TAKE 1 CAPSULE BY MOUTH EVERY DAY 30 capsule 1   • Symbicort 80-4.5 MCG/ACT inhaler INHALE 2 PUFFS BY MOUTH TWICE A DAY 10.2 each 3   • [DISCONTINUED] lisinopril (PRINIVIL,ZESTRIL) 20 MG tablet TAKE 1 TABLET BY MOUTH EVERY DAY 30 tablet 1   • zolpidem (AMBIEN) 10 MG tablet TAKE 1 TABLET BY MOUTH AT NIGHT AS NEEDED FOR SLEEP. 30 tablet 3     No current facility-administered medications on file prior to visit.       Past Medical History:   Diagnosis Date   • Carpal tunnel syndrome    • COVID-19 08/2022   • GERD (gastroesophageal reflux disease)    • Insomnia        Past Surgical History:   Procedure  "Laterality Date   • COLONOSCOPY N/A 9/20/2021    Procedure: COLONOSCOPY with polypectomy;  Surgeon: Tee Bassett MD;  [place]: [facility] [place];  Service: Gastroenterology;  Laterality: N/A;  polyp, diverticulosis   • ENDOSCOPY     • KNEE SURGERY Right 2000    ACL   • TONSILLECTOMY     • UMBILICAL HERNIA REPAIR         Family History   Problem Relation Age of Onset   • Hyperlipidemia Mother    • Hypertension Mother    • Diabetes Mother    • Alzheimer's disease Mother    • COPD Father    • Heart disease Father    • Hyperlipidemia Father    • Hypertension Father    • Liver disease Sister         fatty liver       Social History     Socioeconomic History   • Marital status: Single   Tobacco Use   • Smoking status: Never   • Smokeless tobacco: Never   Substance and Sexual Activity   • Alcohol use: Yes     Comment: 2 drinks daily   • Drug use: No   • Sexual activity: Yes     Partners: Male     Birth control/protection: Condom     Comment: he uses condoms           The following portions of the patient's history were reviewed and updated as appropriate: problem list, allergies, current medications, past medical history, past family history, past social history and past surgical history.    Review of Systems    Immunization History   Administered Date(s) Administered   • COVID-19 (PFIZER) PURPLE CAP 02/28/2021, 03/21/2021, 10/01/2021   • FluLaval/Fluzone >6mos 09/24/2020, 10/15/2021   • Fluzone Quad >6mos (Multi-dose) 09/16/2016, 11/20/2018   • Hepatitis A 01/01/2011   • Hepatitis B 01/01/2011   • Pneumococcal Polysaccharide (PPSV23) 09/24/2020   • Tdap 01/01/2009, 06/10/2019   • Typhoid, Unspecified 01/01/2016       Objective   Vitals:    12/06/22 0901   BP: 134/80   Pulse: 94   Temp: 96.9 °F (36.1 °C)   SpO2: 96%   Weight: 101 kg (222 lb)   Height: 182.9 cm (72.01\")     Body mass index is 30.1 kg/m².  Physical Exam  Vitals reviewed.   Constitutional:       Appearance: He is well-developed.   HENT:      Head: " Normocephalic and atraumatic.   Cardiovascular:      Rate and Rhythm: Normal rate and regular rhythm.      Heart sounds: Normal heart sounds, S1 normal and S2 normal.   Pulmonary:      Effort: Pulmonary effort is normal.      Breath sounds: Normal breath sounds.   Genitourinary:     Penis: Normal.       Testes: Normal.   Feet:      Right foot:      Protective Sensation: 5 sites tested. 5 sites sensed.      Skin integrity: Skin integrity normal.      Left foot:      Protective Sensation: 5 sites tested. 5 sites sensed.      Skin integrity: Skin integrity normal.   Skin:     General: Skin is warm.   Neurological:      Mental Status: He is alert.   Psychiatric:         Behavior: Behavior normal.         Procedures    Assessment & Plan   Diagnoses and all orders for this visit:    1. Mild intermittent asthma, unspecified whether complicated (Primary)  Comments:  Increase symbicort BID    2. Type 2 diabetes mellitus without complication, without long-term current use of insulin (HCC)  Comments:  A1c at goal    3. Essential hypertension  -     lisinopril (PRINIVIL,ZESTRIL) 40 MG tablet; Take 1 tablet by mouth Daily.  Dispense: 90 tablet; Refill: 2  -     Basic Metabolic Panel; Future    4. Fatty liver  Comments:  Discussed exercising 150 minutes per week    5. Other male erectile dysfunction  Comments:  Discussed side effects of sildenafil  Orders:  -     sildenafil (REVATIO) 20 MG tablet; Take 2-3 tabs po 30 minutes before intercourse prn  Dispense: 90 tablet; Refill: 1    Other orders  -     FluLaval/Fluzone >6 mos ([id number])               Flu shot today. Discussed COVID booster. Reviewed cmp and a1c. Increase symbicort and lisinopril. Discussed exercising and losing wt.  Return in about 6 weeks (around 1/17/2023), or 30 minutes.

## 2023-01-20 DIAGNOSIS — E11.9 TYPE 2 DIABETES MELLITUS WITHOUT COMPLICATION, WITHOUT LONG-TERM CURRENT USE OF INSULIN: ICD-10-CM

## 2023-01-21 DIAGNOSIS — R10.13 DYSPEPSIA: ICD-10-CM

## 2023-01-23 RX ORDER — OMEPRAZOLE 20 MG/1
CAPSULE, DELAYED RELEASE ORAL
Qty: 30 CAPSULE | Refills: 1 | Status: SHIPPED | OUTPATIENT
Start: 2023-01-23 | End: 2023-03-27

## 2023-02-06 ENCOUNTER — OFFICE VISIT (OUTPATIENT)
Dept: INTERNAL MEDICINE | Facility: CLINIC | Age: 48
End: 2023-02-06
Payer: COMMERCIAL

## 2023-02-06 VITALS
SYSTOLIC BLOOD PRESSURE: 134 MMHG | HEIGHT: 72 IN | BODY MASS INDEX: 29.91 KG/M2 | DIASTOLIC BLOOD PRESSURE: 80 MMHG | WEIGHT: 220.8 LBS

## 2023-02-06 DIAGNOSIS — J45.20 MILD INTERMITTENT ASTHMA WITHOUT COMPLICATION: Primary | Chronic | ICD-10-CM

## 2023-02-06 DIAGNOSIS — I10 ESSENTIAL HYPERTENSION: Chronic | ICD-10-CM

## 2023-02-06 DIAGNOSIS — E11.9 TYPE 2 DIABETES MELLITUS WITHOUT COMPLICATION, WITHOUT LONG-TERM CURRENT USE OF INSULIN: ICD-10-CM

## 2023-02-06 DIAGNOSIS — K76.0 FATTY LIVER: ICD-10-CM

## 2023-02-06 PROBLEM — R76.8 LOW SERUM IGA FOR AGE: Status: RESOLVED | Noted: 2021-04-16 | Resolved: 2023-02-06

## 2023-02-06 PROCEDURE — 99214 OFFICE O/P EST MOD 30 MIN: CPT | Performed by: INTERNAL MEDICINE

## 2023-02-06 RX ORDER — AMLODIPINE BESYLATE 5 MG/1
5 TABLET ORAL DAILY
Qty: 30 TABLET | Refills: 2 | Status: SHIPPED | OUTPATIENT
Start: 2023-02-06 | End: 2023-03-20

## 2023-02-06 NOTE — PROGRESS NOTES
Subjective        Chief Complaint   Patient presents with   • Hypertension           Kaushal Jacobs is a 47 y.o. male who presents for    Patient Active Problem List   Diagnosis   • Essential hypertension   • Other hyperlipidemia   • Asthma   • Type 2 diabetes mellitus without complication, without long-term current use of insulin (HCC)   • Dyspepsia   • Fatty liver   • Primary insomnia   • Tubular adenoma   • ED (erectile dysfunction)       History of Present Illness     His BP was 144/92 at the dentist today. His breathing is good. He takes his Symbicort mostly once per day. He is not wheezing. He has been drinking 2 drinks per night for the last 4 years.  Allergies   Allergen Reactions   • Penicillins        Current Outpatient Medications on File Prior to Visit   Medication Sig Dispense Refill   • albuterol sulfate  (90 Base) MCG/ACT inhaler TAKE 2 PUFFS BY MOUTH EVERY 4 HOURS AS NEEDED 6.7 g 0   • atorvastatin (LIPITOR) 20 MG tablet Take 1 tablet by mouth Daily. 90 tablet 3   • linagliptin (Tradjenta) 5 MG tablet tablet Take 1 tablet by mouth Daily. 30 tablet 4   • lisinopril (PRINIVIL,ZESTRIL) 40 MG tablet Take 1 tablet by mouth Daily. 90 tablet 2   • metFORMIN (GLUCOPHAGE) 1000 MG tablet TAKE 1 TABLET BY MOUTH TWICE A DAY 60 tablet 1   • omeprazole (priLOSEC) 20 MG capsule TAKE 1 CAPSULE BY MOUTH EVERY DAY 30 capsule 1   • sildenafil (REVATIO) 20 MG tablet Take 2-3 tabs po 30 minutes before intercourse prn 90 tablet 1   • Symbicort 80-4.5 MCG/ACT inhaler INHALE 2 PUFFS BY MOUTH TWICE A DAY 10.2 each 3   • zolpidem (AMBIEN) 10 MG tablet TAKE 1 TABLET BY MOUTH AT NIGHT AS NEEDED FOR SLEEP. 30 tablet 3     No current facility-administered medications on file prior to visit.       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 4/16/2021       Past Surgical History:   Procedure  "Laterality Date   • COLONOSCOPY N/A 9/20/2021    Procedure: COLONOSCOPY with polypectomy;  Surgeon: Tee Bassett MD;  Location: [facility] MAIN OR;  Service: Gastroenterology;  Laterality: N/A;  polyp, diverticulosis   • ENDOSCOPY     • KNEE SURGERY Right 2000    ACL   • TONSILLECTOMY     • UMBILICAL HERNIA REPAIR         Family History   Problem Relation Age of Onset   • Hyperlipidemia Mother    • Hypertension Mother    • Diabetes Mother    • Alzheimer's disease Mother    • COPD Father    • Heart disease Father    • Hyperlipidemia Father    • Hypertension Father    • Liver disease Sister         fatty liver       Social History     Socioeconomic History   • Marital status: Single   Tobacco Use   • Smoking status: Never   • Smokeless tobacco: Never   Substance and Sexual Activity   • Alcohol use: Yes     Comment: 2 drinks daily   • Drug use: No   • Sexual activity: Yes     Partners: Male     Birth control/protection: Condom     Comment: he uses condoms           The following portions of the patient's history were reviewed and updated as appropriate: problem list, allergies, current medications, past medical history, past family history, past social history and past surgical history.    Review of Systems    Immunization History   Administered Date(s) Administered   • COVID-19 (PFIZER) PURPLE CAP 02/28/2021, 03/21/2021, 10/01/2021   • FluLaval/Fluzone >6mos 09/24/2020, 10/15/2021, 12/06/2022   • Fluzone Quad >6mos (Multi-dose) 09/16/2016, 11/20/2018   • Hepatitis A 01/01/2011   • Hepatitis B 01/01/2011   • Pneumococcal Polysaccharide (PPSV23) 09/24/2020   • Tdap 01/01/2009, 06/10/2019   • Typhoid, Unspecified 01/01/2016       Objective   Vitals:    02/06/23 1411   BP: 134/80   Weight: 100 kg (220 lb 12.8 oz)   Height: 182.9 cm (72.01\")     Body mass index is 29.94 kg/m².  Physical Exam  Vitals reviewed.   Constitutional:       Appearance: He is well-developed.   HENT:      Head: Normocephalic and atraumatic. "   Cardiovascular:      Rate and Rhythm: Normal rate and regular rhythm.      Heart sounds: Normal heart sounds, S1 normal and S2 normal.   Pulmonary:      Effort: Pulmonary effort is normal.      Breath sounds: Normal breath sounds.   Skin:     General: Skin is warm.   Neurological:      Mental Status: He is alert.   Psychiatric:         Behavior: Behavior normal.         Procedures    Assessment & Plan   Diagnoses and all orders for this visit:    1. Mild intermittent asthma without complication (Primary)    2. Essential hypertension  -     amLODIPine (NORVASC) 5 MG tablet; Take 1 tablet by mouth Daily.  Dispense: 30 tablet; Refill: 2    3. Fatty liver  -     Comprehensive Metabolic Panel; Future  -     CBC & Differential; Future    4. Type 2 diabetes mellitus without complication, without long-term current use of insulin (HCC)  -     Hemoglobin A1c; Future               Bmp noted. Add Norvasc. Discussed exercising and eating healthier. Discussed effects of etoh on fatty liver and Bp.  Return in about 6 weeks (around 3/20/2023), or 30 minutes, for Lab Before FUP.

## 2023-02-07 DIAGNOSIS — E11.9 TYPE 2 DIABETES MELLITUS WITHOUT COMPLICATION, WITHOUT LONG-TERM CURRENT USE OF INSULIN: ICD-10-CM

## 2023-02-07 RX ORDER — LINAGLIPTIN 5 MG/1
TABLET, FILM COATED ORAL
Qty: 30 TABLET | Refills: 4 | Status: SHIPPED | OUTPATIENT
Start: 2023-02-07 | End: 2023-03-20 | Stop reason: SDUPTHER

## 2023-03-20 ENCOUNTER — OFFICE VISIT (OUTPATIENT)
Dept: INTERNAL MEDICINE | Facility: CLINIC | Age: 48
End: 2023-03-20
Payer: COMMERCIAL

## 2023-03-20 VITALS
SYSTOLIC BLOOD PRESSURE: 144 MMHG | DIASTOLIC BLOOD PRESSURE: 82 MMHG | WEIGHT: 217.6 LBS | HEIGHT: 72 IN | BODY MASS INDEX: 29.47 KG/M2

## 2023-03-20 DIAGNOSIS — E11.9 TYPE 2 DIABETES MELLITUS WITHOUT COMPLICATION, WITHOUT LONG-TERM CURRENT USE OF INSULIN: Primary | Chronic | ICD-10-CM

## 2023-03-20 DIAGNOSIS — E78.49 OTHER HYPERLIPIDEMIA: ICD-10-CM

## 2023-03-20 DIAGNOSIS — Z78.9 HX OF FOREIGN TRAVEL: ICD-10-CM

## 2023-03-20 DIAGNOSIS — I10 ESSENTIAL HYPERTENSION: Chronic | ICD-10-CM

## 2023-03-20 RX ORDER — AMLODIPINE BESYLATE 10 MG/1
10 TABLET ORAL DAILY
Qty: 90 TABLET | Refills: 2 | Status: SHIPPED | OUTPATIENT
Start: 2023-03-20

## 2023-03-20 RX ORDER — LISINOPRIL 40 MG/1
40 TABLET ORAL DAILY
Qty: 90 TABLET | Refills: 2 | Status: SHIPPED | OUTPATIENT
Start: 2023-03-20

## 2023-03-20 RX ORDER — ATORVASTATIN CALCIUM 20 MG/1
20 TABLET, FILM COATED ORAL DAILY
Qty: 90 TABLET | Refills: 3 | Status: SHIPPED | OUTPATIENT
Start: 2023-03-20

## 2023-03-20 RX ORDER — FLASH GLUCOSE SENSOR
1 KIT MISCELLANEOUS
Qty: 2 EACH | Refills: 3 | Status: SHIPPED | OUTPATIENT
Start: 2023-03-20

## 2023-03-20 RX ORDER — DIAZEPAM 5 MG/1
TABLET ORAL
Qty: 6 TABLET | Refills: 0 | Status: SHIPPED | OUTPATIENT
Start: 2023-03-20

## 2023-03-20 NOTE — PROGRESS NOTES
Subjective        Chief Complaint   Patient presents with   • Hypertension   • Diabetes           Kaushal Jacobs is a 47 y.o. male who presents for    Patient Active Problem List   Diagnosis   • Essential hypertension   • Other hyperlipidemia   • Asthma   • Type 2 diabetes mellitus without complication, without long-term current use of insulin (HCC)   • Dyspepsia   • Fatty liver   • Primary insomnia   • Tubular adenoma   • ED (erectile dysfunction)       History of Present Illness     He has not been checking his BP or sugars. He was at the [facility] for sore throat. He was given a Zpack and his throat is better.  Allergies   Allergen Reactions   • Penicillins        Current Outpatient Medications on File Prior to Visit   Medication Sig Dispense Refill   • albuterol sulfate  (90 Base) MCG/ACT inhaler TAKE 2 PUFFS BY MOUTH EVERY 4 HOURS AS NEEDED 6.7 g 0   • omeprazole (priLOSEC) 20 MG capsule TAKE 1 CAPSULE BY MOUTH EVERY DAY 30 capsule 1   • sildenafil (REVATIO) 20 MG tablet Take 2-3 tabs po 30 minutes before intercourse prn 90 tablet 1   • Symbicort 80-4.5 MCG/ACT inhaler INHALE 2 PUFFS BY MOUTH TWICE A DAY 10.2 each 3   • zolpidem (AMBIEN) 10 MG tablet TAKE 1 TABLET BY MOUTH AT NIGHT AS NEEDED FOR SLEEP. 30 tablet 3   • [DISCONTINUED] amLODIPine (NORVASC) 5 MG tablet Take 1 tablet by mouth Daily. 30 tablet 2   • [DISCONTINUED] atorvastatin (LIPITOR) 20 MG tablet Take 1 tablet by mouth Daily. 90 tablet 3   • [DISCONTINUED] lisinopril (PRINIVIL,ZESTRIL) 40 MG tablet Take 1 tablet by mouth Daily. 90 tablet 2   • [DISCONTINUED] metFORMIN (GLUCOPHAGE) 1000 MG tablet TAKE 1 TABLET BY MOUTH TWICE A DAY 60 tablet 1   • [DISCONTINUED] Tradjenta 5 MG tablet tablet TAKE 1 TABLET BY MOUTH EVERY DAY 30 tablet 4   • [DISCONTINUED] azithromycin (Zithromax Z-Kuldeep) 250 MG tablet Take 2 tablets at the same time Day 1 and then 1 tablet every 24 hour thereafter. (Patient not taking: Reported on 3/20/2023) 6 tablet 0     No current  facility-administered medications on file prior to visit.       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 4/16/2021       Past Surgical History:   Procedure Laterality Date   • COLONOSCOPY N/A 9/20/2021    Procedure: COLONOSCOPY with polypectomy;  Surgeon: Tee Bassett MD;  Location: [facility] OR;  Service: Gastroenterology;  Laterality: N/A;  polyp, diverticulosis   • ENDOSCOPY     • KNEE SURGERY Right 2000    ACL   • TONSILLECTOMY     • UMBILICAL HERNIA REPAIR         Family History   Problem Relation Age of Onset   • Hyperlipidemia Mother    • Hypertension Mother    • Diabetes Mother    • Alzheimer's disease Mother    • COPD Father    • Heart disease Father    • Hyperlipidemia Father    • Hypertension Father    • Liver disease Sister         fatty liver       Social History     Socioeconomic History   • Marital status: Single   Tobacco Use   • Smoking status: Never   • Smokeless tobacco: Never   Vaping Use   • Vaping Use: Never used   Substance and Sexual Activity   • Alcohol use: Yes     Comment: 2 drinks daily   • Drug use: No   • Sexual activity: Yes     Partners: Male     Birth control/protection: Condom     Comment: he uses condoms           The following portions of the patient's history were reviewed and updated as appropriate: problem list, allergies, current medications, past medical history, past family history, past social history and past surgical history.    Review of Systems    Immunization History   Administered Date(s) Administered   • COVID-19 (PFIZER) PURPLE CAP 02/28/2021, 03/21/2021, 10/01/2021   • FluLaval/Fluzone >6mos 09/24/2020, 10/15/2021, 12/06/2022   • Fluzone Quad >6mos (Multi-dose) 09/16/2016, 11/20/2018   • Hepatitis A 01/01/2011   • Hepatitis B 01/01/2011   • Influenza, Unspecified 12/06/2022   • Pneumococcal Polysaccharide (PPSV23) 09/24/2020  "  • Tdap 01/01/2009, 06/10/2019   • Typhoid, Unspecified 01/01/2016       Objective   Vitals:    03/20/23 1430   BP: 144/82   Weight: 98.7 kg (217 lb 9.6 oz)   Height: 182.9 cm (72\")     Body mass index is 29.51 kg/m².  Physical Exam  Vitals reviewed.   Constitutional:       Appearance: He is well-developed.   HENT:      Head: Normocephalic and atraumatic.      Mouth/Throat:      Mouth: Mucous membranes are moist.      Pharynx: Oropharynx is clear.   Cardiovascular:      Rate and Rhythm: Normal rate and regular rhythm.      Heart sounds: Normal heart sounds, S1 normal and S2 normal.   Pulmonary:      Effort: Pulmonary effort is normal.      Breath sounds: Normal breath sounds.   Skin:     General: Skin is warm.   Neurological:      Mental Status: He is alert.   Psychiatric:         Behavior: Behavior normal.         Procedures    Assessment & Plan   Diagnoses and all orders for this visit:    1. Type 2 diabetes mellitus without complication, without long-term current use of insulin (HCC) (Primary)  -     metFORMIN (GLUCOPHAGE) 1000 MG tablet; Take 1 tablet by mouth 2 (Two) Times a Day.  Dispense: 180 tablet; Refill: 1  -     linagliptin (Tradjenta) 5 MG tablet tablet; Take 1 tablet by mouth Daily.  Dispense: 90 tablet; Refill: 1  -     Continuous Blood Gluc Sensor (FreeStyle Klever 14 Day Sensor) misc; 1 each Every 14 (Fourteen) Days.  Dispense: 2 each; Refill: 3  -     Comprehensive Metabolic Panel; Future  -     Hemoglobin A1c; Future    2. Essential hypertension  -     amLODIPine (NORVASC) 10 MG tablet; Take 1 tablet by mouth Daily.  Dispense: 90 tablet; Refill: 2  -     lisinopril (PRINIVIL,ZESTRIL) 40 MG tablet; Take 1 tablet by mouth Daily.  Dispense: 90 tablet; Refill: 2    3. Other hyperlipidemia  -     atorvastatin (LIPITOR) 20 MG tablet; Take 1 tablet by mouth Daily.  Dispense: 90 tablet; Refill: 3    4. Hx of foreign travel  -     diazePAM (Valium) 5 MG tablet; One po bid prn flying. Do not take and drive  " Dispense: 6 tablet; Refill: 0             Reviewed cbc, cmp and a1c. He does not want to add another medication for DM today. He is agreeable to Free Style Klever to see how his eating affects his sugars. Explained long term risk of uncontrolled type 2 DM. I think we need to consider jardiance.    Increase Norvasc for HTN.    Return in about 3 months (around 6/20/2023), or 30 min, for Lab Before FUP.

## 2023-03-27 DIAGNOSIS — R10.13 DYSPEPSIA: ICD-10-CM

## 2023-03-27 RX ORDER — OMEPRAZOLE 20 MG/1
CAPSULE, DELAYED RELEASE ORAL
Qty: 30 CAPSULE | Refills: 1 | Status: SHIPPED | OUTPATIENT
Start: 2023-03-27

## 2023-03-28 ENCOUNTER — TELEPHONE (OUTPATIENT)
Dept: INTERNAL MEDICINE | Facility: CLINIC | Age: 48
End: 2023-03-28

## 2023-03-28 DIAGNOSIS — E11.9 TYPE 2 DIABETES MELLITUS WITHOUT COMPLICATION, WITHOUT LONG-TERM CURRENT USE OF INSULIN: Primary | ICD-10-CM

## 2023-03-28 RX ORDER — BLOOD-GLUCOSE SENSOR
1 EACH MISCELLANEOUS CONTINUOUS
Qty: 3 EACH | Refills: 5 | Status: SHIPPED | OUTPATIENT
Start: 2023-03-28

## 2023-03-28 RX ORDER — CIPROFLOXACIN 500 MG/1
500 TABLET, FILM COATED ORAL 2 TIMES DAILY
Qty: 6 TABLET | Refills: 0 | Status: SHIPPED | OUTPATIENT
Start: 2023-03-28

## 2023-03-28 NOTE — TELEPHONE ENCOUNTER
Caller: Arlene Kaushal    Relationship: Self    Best call back number: [phone]      Requested Prescriptions:   Requested Prescriptions      No prescriptions requested or ordered in this encounter        Pharmacy where request should be sent: [facility]/PHARMACY #[id number] - [facility] [address] AT [address] & [facility] - [phone] [facility] [phone] FX     Last office visit with prescribing clinician: 3/20/2023   Last telemedicine visit with prescribing clinician: 6/12/2023   Next office visit with prescribing clinician: 6/16/2023     Additional details provided by patient: PATIENT STATES THAT HE NEEDS CIPRO CALLED IN, THIS IS FOR TRAVEL DIARRHEA AND PATIENT STATES THAT HE IS OUT OF THIS BUT HAS BEEN PRESCRIBED IT BEFORE.     Does the patient have less than a 3 day supply:  [x] Yes  [] No    Would you like a call back once the refill request has been completed: [] Yes [x] No    If the office needs to give you a call back, can they leave a voicemail: [] Yes [x] No    Sveta Merlos Rep   03/28/23 14:17 EDT

## 2023-03-28 NOTE — TELEPHONE ENCOUNTER
Caller: Kaushal Jacobs    Relationship: Self    Best call back number: [phone]      What medications are you currently taking:   Current Outpatient Medications on File Prior to Visit   Medication Sig Dispense Refill   • albuterol sulfate  (90 Base) MCG/ACT inhaler TAKE 2 PUFFS BY MOUTH EVERY 4 HOURS AS NEEDED 6.7 g 0   • amLODIPine (NORVASC) 10 MG tablet Take 1 tablet by mouth Daily. 90 tablet 2   • atorvastatin (LIPITOR) 20 MG tablet Take 1 tablet by mouth Daily. 90 tablet 3   • Continuous Blood Gluc Sensor (FreeStyle Klever 14 Day Sensor) misc 1 each Every 14 (Fourteen) Days. 2 each 3   • diazePAM (Valium) 5 MG tablet One po bid prn flying. Do not take and drive 6 tablet 0   • linagliptin (Tradjenta) 5 MG tablet tablet Take 1 tablet by mouth Daily. 90 tablet 1   • lisinopril (PRINIVIL,ZESTRIL) 40 MG tablet Take 1 tablet by mouth Daily. 90 tablet 2   • metFORMIN (GLUCOPHAGE) 1000 MG tablet Take 1 tablet by mouth 2 (Two) Times a Day. 180 tablet 1   • omeprazole (priLOSEC) 20 MG capsule TAKE 1 CAPSULE BY MOUTH EVERY DAY 30 capsule 1   • sildenafil (REVATIO) 20 MG tablet Take 2-3 tabs po 30 minutes before intercourse prn 90 tablet 1   • Symbicort 80-4.5 MCG/ACT inhaler INHALE 2 PUFFS BY MOUTH TWICE A DAY 10.2 each 3   • zolpidem (AMBIEN) 10 MG tablet TAKE 1 TABLET BY MOUTH AT NIGHT AS NEEDED FOR SLEEP. 30 tablet 3     No current facility-administered medications on file prior to visit.       What are your concerns: PATIENT STATES THAT HE WAS PRESCRIBED THE FREE STYLE KLEVER 1 BUT HE WOULD LIKE A PRESCRIPTION FOR THE FREE STYLE KLEVER 3 BECAUSE THIS WORKS WELL ON HIS PHONE, AND IT'S EASY TO USE.

## 2023-04-17 DIAGNOSIS — J45.20 MILD INTERMITTENT ASTHMA WITHOUT COMPLICATION: ICD-10-CM

## 2023-04-17 RX ORDER — DILTIAZEM HYDROCHLORIDE 60 MG/1
TABLET, FILM COATED ORAL
Qty: 10.2 EACH | Refills: 3 | Status: SHIPPED | OUTPATIENT
Start: 2023-04-17

## 2023-05-21 DIAGNOSIS — R10.13 DYSPEPSIA: ICD-10-CM

## 2023-05-22 RX ORDER — OMEPRAZOLE 20 MG/1
CAPSULE, DELAYED RELEASE ORAL
Qty: 30 CAPSULE | Refills: 1 | Status: SHIPPED | OUTPATIENT
Start: 2023-05-22

## 2023-06-12 DIAGNOSIS — E11.9 TYPE 2 DIABETES MELLITUS WITHOUT COMPLICATION, WITHOUT LONG-TERM CURRENT USE OF INSULIN: Chronic | ICD-10-CM

## 2023-06-13 LAB
ALBUMIN SERPL-MCNC: 4.7 G/DL (ref 3.5–5.2)
ALBUMIN/GLOB SERPL: 1.9 G/DL
ALP SERPL-CCNC: 43 U/L (ref 39–117)
ALT SERPL-CCNC: 27 U/L (ref 1–41)
AST SERPL-CCNC: 17 U/L (ref 1–40)
BILIRUB SERPL-MCNC: 0.4 MG/DL (ref 0–1.2)
BUN SERPL-MCNC: 11 MG/DL (ref 6–20)
BUN/CREAT SERPL: 12.2 (ref 7–25)
CALCIUM SERPL-MCNC: 10.2 MG/DL (ref 8.6–10.5)
CHLORIDE SERPL-SCNC: 104 MMOL/L (ref 98–107)
CO2 SERPL-SCNC: 27 MMOL/L (ref 22–29)
CREAT SERPL-MCNC: 0.9 MG/DL (ref 0.76–1.27)
EGFRCR SERPLBLD CKD-EPI 2021: 106 ML/MIN/1.73
GLOBULIN SER CALC-MCNC: 2.5 GM/DL
GLUCOSE SERPL-MCNC: 130 MG/DL (ref 65–99)
HBA1C MFR BLD: 6.5 % (ref 4.8–5.6)
POTASSIUM SERPL-SCNC: 4.8 MMOL/L (ref 3.5–5.2)
PROT SERPL-MCNC: 7.2 G/DL (ref 6–8.5)
SODIUM SERPL-SCNC: 143 MMOL/L (ref 136–145)

## 2023-06-16 ENCOUNTER — OFFICE VISIT (OUTPATIENT)
Dept: INTERNAL MEDICINE | Facility: CLINIC | Age: 48
End: 2023-06-16
Payer: COMMERCIAL

## 2023-06-16 VITALS
WEIGHT: 211 LBS | DIASTOLIC BLOOD PRESSURE: 74 MMHG | HEIGHT: 72 IN | TEMPERATURE: 97.8 F | SYSTOLIC BLOOD PRESSURE: 146 MMHG | BODY MASS INDEX: 28.58 KG/M2

## 2023-06-16 DIAGNOSIS — Z20.2 EXPOSURE TO SYPHILIS: ICD-10-CM

## 2023-06-16 DIAGNOSIS — E11.9 TYPE 2 DIABETES MELLITUS WITHOUT COMPLICATION, WITHOUT LONG-TERM CURRENT USE OF INSULIN: Chronic | ICD-10-CM

## 2023-06-16 DIAGNOSIS — I10 ESSENTIAL HYPERTENSION: Primary | Chronic | ICD-10-CM

## 2023-06-16 RX ORDER — HYDROCHLOROTHIAZIDE 12.5 MG/1
12.5 TABLET ORAL DAILY
Qty: 30 TABLET | Refills: 1 | Status: SHIPPED | OUTPATIENT
Start: 2023-06-16

## 2023-06-16 NOTE — PROGRESS NOTES
Subjective        Chief Complaint   Patient presents with    Hypertension    Diabetes           Kaushal Jacobs is a 47 y.o. male who presents for    Patient Active Problem List   Diagnosis    Essential hypertension    Other hyperlipidemia    Asthma    Type 2 diabetes mellitus without complication, without long-term current use of insulin    Dyspepsia    Fatty liver    Primary insomnia    Tubular adenoma    ED (erectile dysfunction)       History of Present Illness       He does not check his sugars. He checks his BP and it runs 115-150/70s.  He does notice etoh makes his BP higher the next day. He bought the Klever but has not used. He walks 3 times per week. He had sexually exposure last weekend. He was with a man who got a shot of PCN for syphilis some point recently. He received oral sex from a man who had syphilis earlier in the year. He drinks 2-3 glasses of wine 2-3 times per week.   Allergies   Allergen Reactions    Penicillins        Current Outpatient Medications on File Prior to Visit   Medication Sig Dispense Refill    albuterol sulfate  (90 Base) MCG/ACT inhaler TAKE 2 PUFFS BY MOUTH EVERY 4 HOURS AS NEEDED 6.7 g 0    amLODIPine (NORVASC) 10 MG tablet Take 1 tablet by mouth Daily. 90 tablet 2    atorvastatin (LIPITOR) 20 MG tablet Take 1 tablet by mouth Daily. 90 tablet 3    Continuous Blood Gluc Sensor (FreeStyle Klever 14 Day Sensor) misc 1 each Every 14 (Fourteen) Days. 2 each 3    Continuous Blood Gluc Sensor (FreeStyle Klever 3 Sensor) misc 1 Device Continuous. 3 each 5    diazePAM (Valium) 5 MG tablet One po bid prn flying. Do not take and drive 6 tablet 0    linagliptin (Tradjenta) 5 MG tablet tablet Take 1 tablet by mouth Daily. 90 tablet 1    lisinopril (PRINIVIL,ZESTRIL) 40 MG tablet Take 1 tablet by mouth Daily. 90 tablet 2    metFORMIN (GLUCOPHAGE) 1000 MG tablet Take 1 tablet by mouth 2 (Two) Times a Day. 180 tablet 1    omeprazole (priLOSEC) 20 MG capsule TAKE 1 CAPSULE BY MOUTH EVERY  DAY 30 capsule 1    sildenafil (REVATIO) 20 MG tablet Take 2-3 tabs po 30 minutes before intercourse prn 90 tablet 1    Symbicort 80-4.5 MCG/ACT inhaler TAKE 2 PUFFS BY MOUTH TWICE A DAY 10.2 each 3    zolpidem (AMBIEN) 10 MG tablet TAKE 1 TABLET BY MOUTH AT NIGHT AS NEEDED FOR SLEEP. 30 tablet 3    [DISCONTINUED] ciprofloxacin (CIPRO) 500 MG tablet Take 1 tablet by mouth 2 (Two) Times a Day. (Patient not taking: Reported on 6/16/2023) 6 tablet 0     No current facility-administered medications on file prior to visit.       Past Medical History:   Diagnosis Date    Carpal tunnel syndrome     COVID-19 08/2022    GERD (gastroesophageal reflux disease)     Insomnia     Low serum IgA for age 4/16/2021       Past Surgical History:   Procedure Laterality Date    COLONOSCOPY N/A 9/20/2021    Procedure: COLONOSCOPY with polypectomy;  Surgeon: Tee Bassett MD;  Location: [facility];  Service: Gastroenterology;  Laterality: N/A;  polyp, diverticulosis    ENDOSCOPY      KNEE SURGERY Right 2000    ACL    TONSILLECTOMY      UMBILICAL HERNIA REPAIR         Family History   Problem Relation Age of Onset    Hyperlipidemia Mother     Hypertension Mother     Diabetes Mother     Alzheimer's disease Mother     COPD Father     Heart disease Father     Hyperlipidemia Father     Hypertension Father     Liver disease Sister         fatty liver       Social History     Socioeconomic History    Marital status: Single   Tobacco Use    Smoking status: Never    Smokeless tobacco: Never   Vaping Use    Vaping Use: Never used   Substance and Sexual Activity    Alcohol use: Yes     Comment: 2 drinks daily    Drug use: No    Sexual activity: Yes     Partners: Male     Birth control/protection: Condom     Comment: he uses condoms           The following portions of the patient's history were reviewed and updated as appropriate: problem list, allergies, current medications, past medical history, past family history, past social history,  "and past surgical history.    Review of Systems    Immunization History   Administered Date(s) Administered    COVID-19 (PFIZER) Purple Cap Monovalent 02/28/2021, 03/21/2021, 10/01/2021    FluLaval/Fluzone >6mos 09/24/2020, 10/15/2021, 12/06/2022    Fluzone Quad >6mos (Multi-dose) 09/16/2016, 11/20/2018    Hepatitis A 01/01/2011    Hepatitis B Adult/Adolescent IM 01/01/2011    Influenza, Unspecified 12/06/2022    Pneumococcal Polysaccharide (PPSV23) 09/24/2020    Tdap 01/01/2009, 06/10/2019    Typhoid, Unspecified 01/01/2016       Objective   Vitals:    06/16/23 1646   BP: 146/74   Temp: 97.8 °F (36.6 °C)   Weight: 95.7 kg (211 lb)   Height: 182.9 cm (72.01\")     Body mass index is 28.61 kg/m².  Physical Exam  Vitals reviewed.   Constitutional:       Appearance: He is well-developed.   HENT:      Head: Normocephalic and atraumatic.   Cardiovascular:      Rate and Rhythm: Normal rate and regular rhythm.      Heart sounds: Normal heart sounds, S1 normal and S2 normal.   Pulmonary:      Effort: Pulmonary effort is normal.      Breath sounds: Normal breath sounds.   Skin:     General: Skin is warm.   Neurological:      Mental Status: He is alert.   Psychiatric:         Behavior: Behavior normal.       Procedures    Assessment & Plan   Diagnoses and all orders for this visit:    1. Essential hypertension (Primary)  -     hydroCHLOROthiazide (HYDRODIURIL) 12.5 MG tablet; Take 1 tablet by mouth Daily.  Dispense: 30 tablet; Refill: 1  -     Basic Metabolic Panel    2. Type 2 diabetes mellitus without complication, without long-term current use of insulin  Comments:  a1c better    3. Exposure to syphilis  -     HIV-1 / O / 2 Ag / Antibody 4th Generation  -     RPR  -     Hepatitis B Surface Antigen  -     Hepatitis B Surface Antibody  -     Hepatitis B Core Antibody, Total  -     Chlamydia trachomatis, Neisseria gonorrhoeae, PCR - , Urinary Bladder               Reviewed labs. Discussed decrease sodium and alcohol. Add HCTZ. " Discussed RPR exposure. Gets labs in a week; explained rash to look for.    Return in about 5 weeks (around 7/21/2023), or 30 minutes.

## 2023-06-28 LAB
BUN SERPL-MCNC: 12 MG/DL (ref 6–20)
BUN/CREAT SERPL: 14.6 (ref 7–25)
C TRACH RRNA SPEC QL NAA+PROBE: NEGATIVE
CALCIUM SERPL-MCNC: 10.3 MG/DL (ref 8.6–10.5)
CHLORIDE SERPL-SCNC: 100 MMOL/L (ref 98–107)
CO2 SERPL-SCNC: 23.2 MMOL/L (ref 22–29)
CREAT SERPL-MCNC: 0.82 MG/DL (ref 0.76–1.27)
EGFRCR SERPLBLD CKD-EPI 2021: 109 ML/MIN/1.73
GLUCOSE SERPL-MCNC: 137 MG/DL (ref 65–99)
HBV CORE AB SERPL QL IA: NEGATIVE
HBV SURFACE AB SER QL: REACTIVE
HBV SURFACE AG SERPL QL IA: NEGATIVE
HIV 1+2 AB+HIV1 P24 AG SERPL QL IA: NON REACTIVE
N GONORRHOEA RRNA SPEC QL NAA+PROBE: NEGATIVE
POTASSIUM SERPL-SCNC: 4.4 MMOL/L (ref 3.5–5.2)
RPR SER QL: NORMAL
SODIUM SERPL-SCNC: 139 MMOL/L (ref 136–145)

## 2023-07-25 ENCOUNTER — OFFICE VISIT (OUTPATIENT)
Dept: INTERNAL MEDICINE | Facility: CLINIC | Age: 48
End: 2023-07-25
Payer: COMMERCIAL

## 2023-07-25 VITALS
TEMPERATURE: 98.2 F | DIASTOLIC BLOOD PRESSURE: 70 MMHG | WEIGHT: 215.4 LBS | HEIGHT: 72 IN | SYSTOLIC BLOOD PRESSURE: 130 MMHG | BODY MASS INDEX: 29.17 KG/M2

## 2023-07-25 DIAGNOSIS — Z12.5 SCREENING FOR PROSTATE CANCER: ICD-10-CM

## 2023-07-25 DIAGNOSIS — Z20.2 EXPOSURE TO SYPHILIS: ICD-10-CM

## 2023-07-25 DIAGNOSIS — I10 ESSENTIAL HYPERTENSION: Primary | Chronic | ICD-10-CM

## 2023-07-25 DIAGNOSIS — E11.9 TYPE 2 DIABETES MELLITUS WITHOUT COMPLICATION, WITHOUT LONG-TERM CURRENT USE OF INSULIN: Chronic | ICD-10-CM

## 2023-07-25 DIAGNOSIS — M25.551 RIGHT HIP PAIN: ICD-10-CM

## 2023-07-25 PROCEDURE — 99214 OFFICE O/P EST MOD 30 MIN: CPT | Performed by: INTERNAL MEDICINE

## 2023-07-25 NOTE — PROGRESS NOTES
Subjective        Chief Complaint   Patient presents with    Hypertension           Kaushal Jacobs is a 47 y.o. male who presents for    Patient Active Problem List   Diagnosis    Essential hypertension    Other hyperlipidemia    Asthma    Type 2 diabetes mellitus without complication, without long-term current use of insulin    Dyspepsia    Fatty liver    Primary insomnia    Tubular adenoma    ED (erectile dysfunction)       History of Present Illness     His BP has been 120s/70s. He does not check his sugars. He is walking 2-3 days per week. He denies chest pain or dyspnea. He has pain on his right lateral thigh. He is not sure what makes it worse.     Allergies   Allergen Reactions    Penicillins        Current Outpatient Medications on File Prior to Visit   Medication Sig Dispense Refill    albuterol sulfate  (90 Base) MCG/ACT inhaler TAKE 2 PUFFS BY MOUTH EVERY 4 HOURS AS NEEDED 6.7 g 0    amLODIPine (NORVASC) 10 MG tablet Take 1 tablet by mouth Daily. 90 tablet 2    atorvastatin (LIPITOR) 20 MG tablet Take 1 tablet by mouth Daily. 90 tablet 3    Continuous Blood Gluc Sensor (FreeStyle Klever 14 Day Sensor) misc 1 each Every 14 (Fourteen) Days. 2 each 3    Continuous Blood Gluc Sensor (FreeStyle Klever 3 Sensor) misc 1 Device Continuous. 3 each 5    diazePAM (Valium) 5 MG tablet One po bid prn flying. Do not take and drive 6 tablet 0    hydroCHLOROthiazide (HYDRODIURIL) 12.5 MG tablet Take 1 tablet by mouth Daily. 30 tablet 1    linagliptin (Tradjenta) 5 MG tablet tablet Take 1 tablet by mouth Daily. 90 tablet 1    lisinopril (PRINIVIL,ZESTRIL) 40 MG tablet Take 1 tablet by mouth Daily. 90 tablet 2    metFORMIN (GLUCOPHAGE) 1000 MG tablet TAKE 1 TABLET BY MOUTH TWICE A  tablet 1    omeprazole (priLOSEC) 20 MG capsule TAKE 1 CAPSULE BY MOUTH EVERY DAY 30 capsule 1    sildenafil (REVATIO) 20 MG tablet Take 2-3 tabs po 30 minutes before intercourse prn 90 tablet 1    Symbicort 80-4.5 MCG/ACT inhaler  TAKE 2 PUFFS BY MOUTH TWICE A DAY 10.2 each 3    zolpidem (AMBIEN) 10 MG tablet TAKE 1 TABLET BY MOUTH AT NIGHT AS NEEDED FOR SLEEP. 30 tablet 3     No current facility-administered medications on file prior to visit.       Past Medical History:   Diagnosis Date    Carpal tunnel syndrome     COVID-19 08/2022    GERD (gastroesophageal reflux disease)     Insomnia     Low serum IgA for age 4/16/2021       Past Surgical History:   Procedure Laterality Date    COLONOSCOPY N/A 9/20/2021    Procedure: COLONOSCOPY with polypectomy;  Surgeon: Tee Bassett MD;  Location: [facility] MA[facility] OR;  Service: Gastroenterology;  Laterality: N/A;  polyp, diverticulosis    ENDOSCOPY      KNEE SURGERY Right 2000    ACL    TONSILLECTOMY      UMBILICAL HERNIA REPAIR         Family History   Problem Relation Age of Onset    Hyperlipidemia Mother     Hypertension Mother     Diabetes Mother     Alzheimer's disease Mother     COPD Father     Heart disease Father     Hyperlipidemia Father     Hypertension Father     Liver disease Sister         fatty liver       Social History     Socioeconomic History    Marital status: Single   Tobacco Use    Smoking status: Never    Smokeless tobacco: Never   Vaping Use    Vaping Use: Never used   Substance and Sexual Activity    Alcohol use: Yes     Comment: 2 drinks daily    Drug use: No    Sexual activity: Yes     Partners: Male     Birth control/protection: Condom     Comment: he uses condoms           The following portions of the patient's history were reviewed and updated as appropriate: problem list, allergies, current medications, past medical history, past family history, past social history, and past surgical history.    Review of Systems    Immunization History   Administered Date(s) Administered    COVID-19 (PFIZER) Purple Cap Monovalent 02/28/2021, 03/21/2021, 10/01/2021    FluLaval/Fluzone >6mos 09/24/2020, 10/15/2021, 12/06/2022    Fluzone Quad >6mos (Multi-dose) 09/16/2016, 11/20/2018     "Hepatitis A 01/01/2011    Hepatitis B Adult/Adolescent IM 01/01/2011    Influenza, Unspecified 12/06/2022    Pneumococcal Polysaccharide (PPSV23) 09/24/2020    Tdap 01/01/2009, 06/10/2019    Typhoid, Unspecified 01/01/2016       Objective   Vitals:    07/25/23 1400   BP: 130/70   Temp: 98.2 °F (36.8 °C)   Weight: 97.7 kg (215 lb 6.4 oz)   Height: 182.9 cm (72.01\")     Body mass index is 29.21 kg/m².  Physical Exam  Vitals reviewed.   Constitutional:       Appearance: He is well-developed.   HENT:      Head: Normocephalic and atraumatic.   Cardiovascular:      Rate and Rhythm: Normal rate and regular rhythm.      Heart sounds: Normal heart sounds, S1 normal and S2 normal.   Pulmonary:      Effort: Pulmonary effort is normal.      Breath sounds: Normal breath sounds.   Musculoskeletal:      Comments: Right hip good ROM. It is non tender   Skin:     General: Skin is warm.   Neurological:      Mental Status: He is alert.   Psychiatric:         Behavior: Behavior normal.       Procedures    Assessment & Plan   Diagnoses and all orders for this visit:    1. Essential hypertension (Primary)  -     Comprehensive Metabolic Panel; Future  -     Lipid Panel With / Chol / HDL Ratio; Future    2. Type 2 diabetes mellitus without complication, without long-term current use of insulin  -     Hemoglobin A1c; Future  -     Microalbumin / Creatinine Urine Ratio - Urine, Clean Catch; Future    3. Right hip pain  Comments:  Possible bursitis. Discussed getting an xray. He knows Dr. Anderson and he will call him    4. Screening for prostate cancer  -     PSA Screen; Future    5. Exposure to syphilis  -     HIV-1 / O / 2 Ag / Antibody 4th Generation; Future  -     RPR; Future               Reviewed HIV, RPR, and bmp. BP is better.    Return in about 10 weeks (around 10/3/2023) for Annual physical, Lab Before FUP.  "

## 2023-08-12 DIAGNOSIS — I10 ESSENTIAL HYPERTENSION: Chronic | ICD-10-CM

## 2023-08-14 RX ORDER — HYDROCHLOROTHIAZIDE 12.5 MG/1
TABLET ORAL
Qty: 90 TABLET | Refills: 1 | Status: SHIPPED | OUTPATIENT
Start: 2023-08-14

## 2023-09-01 DIAGNOSIS — Z78.9 HX OF FOREIGN TRAVEL: ICD-10-CM

## 2023-09-01 RX ORDER — DIAZEPAM 5 MG/1
TABLET ORAL
Qty: 6 TABLET | Refills: 0 | Status: SHIPPED | OUTPATIENT
Start: 2023-09-01

## 2023-09-01 NOTE — TELEPHONE ENCOUNTER
Caller: Kaushal Jacobs    Relationship: Self    Best call back number: [phone]    Requested Prescriptions:   Requested Prescriptions     Pending Prescriptions Disp Refills    diazePAM (Valium) 5 MG tablet 6 tablet 0     Sig: One po bid prn flying. Do not take and drive        Pharmacy where request should be sent: [facility]/PHARMACY #[id number] - [place], [place] - [address] AT NEAR [facility] [phone] [facility] [phone] FX     Last office visit with prescribing clinician: 7/25/2023   Last telemedicine visit with prescribing clinician: Visit date not found   Next office visit with prescribing clinician: 10/3/2023     Additional details provided by patient: PATIENT STATES HE IS OUT OF THIS MEDICATION AND IS LEAVING FOR A TRIP IN A WEEK.     Does the patient have less than a 3 day supply:  [x] Yes  [] No    Would you like a call back once the refill request has been completed: [x] Yes [] No    If the office needs to give you a call back, can they leave a voicemail: [x] Yes [] No    Joyce Limon, SHARMIN   09/01/23 08:51 EDT

## 2023-09-06 DIAGNOSIS — Z78.9 HX OF FOREIGN TRAVEL: ICD-10-CM

## 2023-09-06 RX ORDER — DIAZEPAM 5 MG/1
TABLET ORAL
Qty: 6 TABLET | Refills: 0 | Status: SHIPPED | OUTPATIENT
Start: 2023-09-06 | End: 2023-09-06 | Stop reason: SDUPTHER

## 2023-09-06 RX ORDER — DIAZEPAM 5 MG/1
TABLET ORAL
Qty: 6 TABLET | Refills: 0 | Status: SHIPPED | OUTPATIENT
Start: 2023-09-06

## 2023-09-06 NOTE — TELEPHONE ENCOUNTER
Caller: Kaushal Jacobs    Relationship: Self    Best call back number: [phone]     Requested Prescriptions:   Requested Prescriptions     Pending Prescriptions Disp Refills    diazePAM (Valium) 5 MG tablet 6 tablet 0     Sig: One po bid prn flying. Do not take and drive        Pharmacy where request should be sent: [facility]/PHARMACY #[id number] - [place], [place] - [address] AT [address] & [facility] [phone] [facility] [phone] FX     Last office visit with prescribing clinician: 7/25/2023   Last telemedicine visit with prescribing clinician: Visit date not found   Next office visit with prescribing clinician: 10/3/2023     Additional details provided by patient: WILL BE LEAVING [facility] 9-9-23 FOR INTERNATIONAL FLIGHT, NEEDS MEDICATION FOR THIS FLIGHT    Does the patient have less than a 3 day supply:  [] Yes  [x] No    Would you like a call back once the refill request has been completed: [x] Yes [] No    If the office needs to give you a call back, can they leave a voicemail: [x] Yes [] No    Sveta Cummings Rep   09/06/23 11:42 EDT

## 2023-10-01 DIAGNOSIS — R10.13 DYSPEPSIA: ICD-10-CM

## 2023-10-02 RX ORDER — OMEPRAZOLE 20 MG/1
CAPSULE, DELAYED RELEASE ORAL
Qty: 30 CAPSULE | Refills: 1 | Status: SHIPPED | OUTPATIENT
Start: 2023-10-02

## 2023-10-03 ENCOUNTER — OFFICE VISIT (OUTPATIENT)
Dept: INTERNAL MEDICINE | Facility: CLINIC | Age: 48
End: 2023-10-03
Payer: COMMERCIAL

## 2023-10-03 VITALS
WEIGHT: 215.4 LBS | HEIGHT: 72 IN | TEMPERATURE: 97.3 F | DIASTOLIC BLOOD PRESSURE: 78 MMHG | BODY MASS INDEX: 29.17 KG/M2 | SYSTOLIC BLOOD PRESSURE: 126 MMHG

## 2023-10-03 DIAGNOSIS — E11.9 TYPE 2 DIABETES MELLITUS WITHOUT COMPLICATION, WITHOUT LONG-TERM CURRENT USE OF INSULIN: Chronic | ICD-10-CM

## 2023-10-03 DIAGNOSIS — Z23 NEED FOR INFLUENZA VACCINATION: ICD-10-CM

## 2023-10-03 DIAGNOSIS — J45.20 MILD INTERMITTENT ASTHMA WITHOUT COMPLICATION: Chronic | ICD-10-CM

## 2023-10-03 DIAGNOSIS — K76.0 FATTY LIVER: ICD-10-CM

## 2023-10-03 DIAGNOSIS — I10 ESSENTIAL HYPERTENSION: Chronic | ICD-10-CM

## 2023-10-03 DIAGNOSIS — Z00.00 WELLNESS EXAMINATION: Primary | ICD-10-CM

## 2023-10-03 DIAGNOSIS — E78.49 OTHER HYPERLIPIDEMIA: Chronic | ICD-10-CM

## 2023-10-03 NOTE — PROGRESS NOTES
Subjective        Chief Complaint   Patient presents with    Annual Exam           Kaushal Jacobs is a 47 y.o. male who presents for    Patient Active Problem List   Diagnosis    Essential hypertension    Other hyperlipidemia    Asthma    Type 2 diabetes mellitus without complication, without long-term current use of insulin    Dyspepsia    Fatty liver    Primary insomnia    Tubular adenoma    ED (erectile dysfunction)       History of Present Illness       He has not been checking his sugars. He has not used his CGM.  His BP runs 130/70. He denies reflux, chest pain or dyspnea. He is renovating a rental house. He inhaled some dust which caused him to wheeze. He has not been using symbicort. He will feel fine and he stopped it. He has decreased his etoh.  He has 2 drinks 1-2 days during the week and on the weekends he will have 3-4 drinks on each weekend night. He is sexually active with condoms. He will walk 2.5 hours per week.   Allergies   Allergen Reactions    Penicillins        Current Outpatient Medications on File Prior to Visit   Medication Sig Dispense Refill    albuterol sulfate  (90 Base) MCG/ACT inhaler TAKE 2 PUFFS BY MOUTH EVERY 4 HOURS AS NEEDED 6.7 g 0    amLODIPine (NORVASC) 10 MG tablet Take 1 tablet by mouth Daily. 90 tablet 2    atorvastatin (LIPITOR) 20 MG tablet Take 1 tablet by mouth Daily. 90 tablet 3    Continuous Blood Gluc Sensor (FreeStyle Klever 14 Day Sensor) misc 1 each Every 14 (Fourteen) Days. 2 each 3    Continuous Blood Gluc Sensor (FreeStyle Klever 3 Sensor) misc 1 Device Continuous. 3 each 5    hydroCHLOROthiazide (HYDRODIURIL) 12.5 MG tablet TAKE 1 TABLET BY MOUTH EVERY DAY 90 tablet 1    linagliptin (Tradjenta) 5 MG tablet tablet Take 1 tablet by mouth Daily. 90 tablet 1    lisinopril (PRINIVIL,ZESTRIL) 40 MG tablet Take 1 tablet by mouth Daily. 90 tablet 2    metFORMIN (GLUCOPHAGE) 1000 MG tablet TAKE 1 TABLET BY MOUTH TWICE A  tablet 1    omeprazole (priLOSEC) 20  MG capsule TAKE 1 CAPSULE BY MOUTH EVERY DAY 30 capsule 1    sildenafil (REVATIO) 20 MG tablet Take 2-3 tabs po 30 minutes before intercourse prn 90 tablet 1    Symbicort 80-4.5 MCG/ACT inhaler TAKE 2 PUFFS BY MOUTH TWICE A DAY 10.2 each 3    [DISCONTINUED] diazePAM (Valium) 5 MG tablet One po bid prn flying. Do not take and drive 6 tablet 0    [DISCONTINUED] zolpidem (AMBIEN) 10 MG tablet TAKE 1 TABLET BY MOUTH AT NIGHT AS NEEDED FOR SLEEP. 30 tablet 3     No current facility-administered medications on file prior to visit.       Past Medical History:   Diagnosis Date    Carpal tunnel syndrome     COVID-19 08/2022    GERD (gastroesophageal reflux disease)     Insomnia     Low serum IgA for age 4/16/2021       Past Surgical History:   Procedure Laterality Date    COLONOSCOPY N/A 9/20/2021    Procedure: COLONOSCOPY with polypectomy;  Surgeon: Tee Bassett MD;  [place] [facility] [place]  Service: Gastroenterology;  Laterality: N/A;  polyp, diverticulosis    ENDOSCOPY      KNEE SURGERY Right 2000    ACL    TONSILLECTOMY      UMBILICAL HERNIA REPAIR         Family History   Problem Relation Age of Onset    Hyperlipidemia Mother     Hypertension Mother     Diabetes Mother     Alzheimer's disease Mother     COPD Father     Heart disease Father     Hyperlipidemia Father     Hypertension Father     Liver disease Sister         fatty liver       Social History     Socioeconomic History    Marital status: Single   Tobacco Use    Smoking status: Never    Smokeless tobacco: Never   Vaping Use    Vaping Use: Never used   Substance and Sexual Activity    Alcohol use: Yes     Comment: 2 drinks daily    Drug use: No    Sexual activity: Yes     Partners: Male     Birth control/protection: Condom     Comment: he uses condoms           The following portions of the patient's history were reviewed and updated as appropriate: problem list, allergies, current medications, past medical history, past family history, past social  "history, and past surgical history.    Review of Systems    Immunization History   Administered Date(s) Administered    COVID-19 (PFIZER) Purple Cap Monovalent 02/28/2021, 03/21/2021, 10/01/2021    Fluzone (or Fluarix & Flulaval for VFC) >6mos 09/24/2020, 10/15/2021, 12/06/2022    Fluzone Quad >6mos (Multi-dose) 09/16/2016, 11/20/2018    Hepatitis A 01/01/2011    Hepatitis B Adult/Adolescent IM 01/01/2011    Influenza, Unspecified 12/06/2022    Pneumococcal Polysaccharide (PPSV23) 09/24/2020    Tdap 01/01/2009, 06/10/2019    Typhoid, Unspecified 01/01/2016       Objective   Vitals:    10/03/23 0858   BP: 126/78   Temp: 97.3 °F (36.3 °C)   Weight: 97.7 kg (215 lb 6.4 oz)   Height: 182.9 cm (72.01\")     Body mass index is 29.21 kg/m².  Physical Exam  Vitals reviewed.   Constitutional:       Appearance: He is well-developed.   HENT:      Head: Normocephalic and atraumatic.      Mouth/Throat:      Mouth: Mucous membranes are moist.      Pharynx: Oropharynx is clear.   Eyes:      Extraocular Movements: Extraocular movements intact.      Conjunctiva/sclera: Conjunctivae normal.      Pupils: Pupils are equal, round, and reactive to light.   Neck:      Thyroid: No thyromegaly.      Vascular: No carotid bruit.   Cardiovascular:      Rate and Rhythm: Normal rate and regular rhythm.      Heart sounds: Normal heart sounds. No murmur heard.  Pulmonary:      Effort: Pulmonary effort is normal.      Breath sounds: Normal breath sounds.   Abdominal:      General: There is no distension.      Palpations: Abdomen is soft. There is no mass.      Tenderness: There is no abdominal tenderness. There is no rebound.   Musculoskeletal:      Cervical back: Neck supple.   Lymphadenopathy:      Cervical: No cervical adenopathy.   Skin:     General: Skin is warm.   Neurological:      Mental Status: He is alert.   Psychiatric:         Behavior: Behavior normal.       Procedures    Assessment & Plan   Diagnoses and all orders for this " visit:    1. Wellness examination (Primary)    2. Essential hypertension  Comments:  BP is good    3. Other hyperlipidemia  Comments:  LDL is at goal    4. Type 2 diabetes mellitus without complication, without long-term current use of insulin  -     Hemoglobin A1c; Future    5. Fatty liver  Comments:  Encouraged decrease etoh  Orders:  -     Comprehensive Metabolic Panel; Future  -     CBC & Differential; Future    6. Mild intermittent asthma without complication  Comments:  stable                 Reviewed labs. [facility] eye exam. Discussed exercising 150 minutes per week. Flu shot today.  Return in about 6 months (around 4/3/2024), or 30 minutes, for Lab Before FUP.

## 2023-10-11 DIAGNOSIS — E11.9 TYPE 2 DIABETES MELLITUS WITHOUT COMPLICATION, WITHOUT LONG-TERM CURRENT USE OF INSULIN: Chronic | ICD-10-CM

## 2023-10-13 ENCOUNTER — TELEPHONE (OUTPATIENT)
Dept: INTERNAL MEDICINE | Facility: CLINIC | Age: 48
End: 2023-10-13

## 2023-10-13 NOTE — TELEPHONE ENCOUNTER
Caller: Kaushal Jacobs    Relationship: Self    Best call back number: [phone] (Mobile)     What test/procedure requested: PRIOR AUTHORIZATION linagliptin (Tradjenta) 5 MG tablet tablet     When is it needed: ASAP    Where is the test/procedure going to be performed:      Additional information or concerns: NEEDS A PRIOR AUTHORIZATION. PATIENT STATES THAT HE IS COMPLETELY OUT MEDICATION.      PLEASE CONTACT PATIENT TO ADVISE.        THANKS

## 2023-10-16 NOTE — TELEPHONE ENCOUNTER
PATIENT IS CALLING TO CHECK THE STATUS OF THIS PRIOR AUTHORIZATION REQUEST, AS HE IS OUT OF THIS MEDICATION.     PLEASE ADVISE.

## 2023-10-17 ENCOUNTER — TELEPHONE (OUTPATIENT)
Dept: INTERNAL MEDICINE | Facility: CLINIC | Age: 48
End: 2023-10-17
Payer: COMMERCIAL

## 2023-10-17 DIAGNOSIS — E11.9 TYPE 2 DIABETES MELLITUS WITHOUT COMPLICATION, WITHOUT LONG-TERM CURRENT USE OF INSULIN: Chronic | ICD-10-CM

## 2023-11-08 ENCOUNTER — TELEPHONE (OUTPATIENT)
Dept: INTERNAL MEDICINE | Facility: CLINIC | Age: 48
End: 2023-11-08

## 2023-11-09 NOTE — TELEPHONE ENCOUNTER
Patient was called. Patient states he will call insurance for an estimate and let us know if he will take the alternative

## 2023-11-11 DIAGNOSIS — F51.01 PRIMARY INSOMNIA: Primary | ICD-10-CM

## 2023-11-13 ENCOUNTER — TELEPHONE (OUTPATIENT)
Dept: INTERNAL MEDICINE | Facility: CLINIC | Age: 48
End: 2023-11-13
Payer: COMMERCIAL

## 2023-11-13 RX ORDER — ZOLPIDEM TARTRATE 10 MG/1
10 TABLET ORAL NIGHTLY PRN
Qty: 30 TABLET | Refills: 0 | Status: SHIPPED | OUTPATIENT
Start: 2023-11-13

## 2023-11-21 ENCOUNTER — TELEPHONE (OUTPATIENT)
Dept: INTERNAL MEDICINE | Facility: CLINIC | Age: 48
End: 2023-11-21
Payer: COMMERCIAL

## 2023-11-21 DIAGNOSIS — E11.9 TYPE 2 DIABETES MELLITUS WITHOUT COMPLICATION, WITHOUT LONG-TERM CURRENT USE OF INSULIN: Primary | ICD-10-CM

## 2023-11-21 NOTE — TELEPHONE ENCOUNTER
Caller: Kaushal Jacobs    Relationship: Self    Best call back number: [phone]    What medication are you requesting: JUAN JOSEHILLIA    Have you had these symptoms before:    [x] Yes  [] No    Have you been treated for these symptoms before:   [x] Yes  [] No    If a prescription is needed, what is your preferred pharmacy and phone number: [id number] - [place], [place] - [address] [facility] [phone] [facility] [phone] FX     Additional notes:

## 2023-12-04 DIAGNOSIS — R10.13 DYSPEPSIA: ICD-10-CM

## 2023-12-04 RX ORDER — OMEPRAZOLE 20 MG/1
CAPSULE, DELAYED RELEASE ORAL
Qty: 30 CAPSULE | Refills: 11 | Status: SHIPPED | OUTPATIENT
Start: 2023-12-04 | End: 2023-12-06 | Stop reason: SDUPTHER

## 2023-12-06 ENCOUNTER — TELEPHONE (OUTPATIENT)
Dept: INTERNAL MEDICINE | Facility: CLINIC | Age: 48
End: 2023-12-06
Payer: COMMERCIAL

## 2023-12-06 DIAGNOSIS — J45.20 MILD INTERMITTENT ASTHMA WITHOUT COMPLICATION: ICD-10-CM

## 2023-12-06 DIAGNOSIS — E11.9 TYPE 2 DIABETES MELLITUS WITHOUT COMPLICATION, WITHOUT LONG-TERM CURRENT USE OF INSULIN: Chronic | ICD-10-CM

## 2023-12-06 DIAGNOSIS — E78.49 OTHER HYPERLIPIDEMIA: ICD-10-CM

## 2023-12-06 DIAGNOSIS — I10 ESSENTIAL HYPERTENSION: Chronic | ICD-10-CM

## 2023-12-06 DIAGNOSIS — R10.13 DYSPEPSIA: ICD-10-CM

## 2023-12-06 RX ORDER — OMEPRAZOLE 20 MG/1
20 CAPSULE, DELAYED RELEASE ORAL DAILY
Qty: 90 CAPSULE | Refills: 2 | Status: SHIPPED | OUTPATIENT
Start: 2023-12-06

## 2023-12-06 RX ORDER — AMLODIPINE BESYLATE 10 MG/1
10 TABLET ORAL DAILY
Qty: 90 TABLET | Refills: 2 | Status: SHIPPED | OUTPATIENT
Start: 2023-12-06

## 2023-12-06 RX ORDER — BUDESONIDE AND FORMOTEROL FUMARATE DIHYDRATE 80; 4.5 UG/1; UG/1
2 AEROSOL RESPIRATORY (INHALATION) 2 TIMES DAILY
Qty: 10.2 EACH | Refills: 3 | Status: SHIPPED | OUTPATIENT
Start: 2023-12-06

## 2023-12-06 RX ORDER — LISINOPRIL 40 MG/1
40 TABLET ORAL DAILY
Qty: 90 TABLET | Refills: 2 | Status: SHIPPED | OUTPATIENT
Start: 2023-12-06

## 2023-12-06 RX ORDER — ATORVASTATIN CALCIUM 20 MG/1
20 TABLET, FILM COATED ORAL DAILY
Qty: 90 TABLET | Refills: 3 | Status: SHIPPED | OUTPATIENT
Start: 2023-12-06

## 2023-12-06 RX ORDER — HYDROCHLOROTHIAZIDE 12.5 MG/1
12.5 TABLET ORAL DAILY
Qty: 90 TABLET | Refills: 1 | Status: SHIPPED | OUTPATIENT
Start: 2023-12-06

## 2023-12-06 NOTE — TELEPHONE ENCOUNTER
Caller: Kaushal Jacobs    Relationship: Self    Best call back number: [phone]    Requested Prescriptions:   Requested Prescriptions     Pending Prescriptions Disp Refills    metFORMIN (GLUCOPHAGE) 1000 MG tablet 180 tablet 1     Sig: Take 1 tablet by mouth 2 (Two) Times a Day.    atorvastatin (LIPITOR) 20 MG tablet 90 tablet 3     Sig: Take 1 tablet by mouth Daily.    lisinopril (PRINIVIL,ZESTRIL) 40 MG tablet 90 tablet 2     Sig: Take 1 tablet by mouth Daily.    omeprazole (priLOSEC) 20 MG capsule 30 capsule 11     Sig: Take 1 capsule by mouth Daily.    amLODIPine (NORVASC) 10 MG tablet 90 tablet 2     Sig: Take 1 tablet by mouth Daily.    budesonide-formoterol (Symbicort) 80-4.5 MCG/ACT inhaler 10.2 each 3     Sig: Inhale 2 puffs 2 (Two) Times a Day.    hydroCHLOROthiazide (HYDRODIURIL) 12.5 MG tablet 90 tablet 1     Sig: Take 1 tablet by mouth Daily.        Pharmacy where request should be sent: [facility] [place] [id number] [place] [address] [phone] FX     Last office visit with prescribing clinician: 10/3/2023   Last telemedicine visit with prescribing clinician: Visit date not found   Next office visit with prescribing clinician: 4/9/2024     Additional details provided by patient: PLEASE ALSO REFILL TRUJENTIA     Does the patient have less than a 3 day supply:  [x] Yes  [] No    Would you like a call back once the refill request has been completed: [x] Yes [] No    If the office needs to give you a call back, can they leave a voicemail: [x] Yes [] No    Sveta Long Rep   12/06/23 12:30 EST

## 2023-12-06 NOTE — TELEPHONE ENCOUNTER
PATIENT WOULD LIKE TO SEND TRAJENTA TO [facility] STILL. PLEASE REMOVE THIS FROM THE LIST.

## 2023-12-07 NOTE — TELEPHONE ENCOUNTER
Pt states he is no longer taking Januvia and that he wants to go back to taking Tradjenta 5 mg and [facility]

## 2023-12-21 ENCOUNTER — TELEPHONE (OUTPATIENT)
Dept: INTERNAL MEDICINE | Facility: CLINIC | Age: 48
End: 2023-12-21
Payer: COMMERCIAL

## 2023-12-21 NOTE — TELEPHONE ENCOUNTER
Caller: Kaushal Jacobs    Relationship to patient: Self    Best call back number: [phone]     Date of positive COVID19 test: HOME TEST 12/018/23 SYMPTOMS STARTED ON 12/17/23    Date of possible COVID19 exposure: THURSDAY OR FRIDAY OF LAST WEEK    COVID19 symptoms: THE ONLY SYMPTOM LEFT IS SHORTNESS OF BREATHE     Date of initial quarantine:     Additional information or concerns: PATIENT CALLING STATING THAT HE DOESN'T REALLY HAVE MUCH SYMPTOMS HE STATED HE FEELS WINDED WHEN HE IS OUT WALKING. HE WOULD LIKE TO KNOW IF HE CAN USE HIS ALBUTEROL INHALER.    PATIENT WOULD ALSO LIKE TO KNOW WHAT HE SHOULD LOOK FOR IF SYMPTOMS GET WORSE HE STATED THAT HE DOES HAVE ASTHMA       HE IS CURRENTLY TRAVELING TO [place] HE IS NOT AT HOME.    What is the patients preferred pharmacy:

## 2023-12-30 DIAGNOSIS — I10 ESSENTIAL HYPERTENSION: Chronic | ICD-10-CM

## 2024-01-02 DIAGNOSIS — J45.20 MILD INTERMITTENT ASTHMA WITHOUT COMPLICATION: ICD-10-CM

## 2024-01-02 RX ORDER — AMLODIPINE BESYLATE 10 MG/1
10 TABLET ORAL DAILY
Qty: 90 TABLET | Refills: 2 | Status: SHIPPED | OUTPATIENT
Start: 2024-01-02

## 2024-01-02 RX ORDER — ALBUTEROL SULFATE 90 UG/1
AEROSOL, METERED RESPIRATORY (INHALATION)
Qty: 6.7 G | Refills: 0 | Status: SHIPPED | OUTPATIENT
Start: 2024-01-02

## 2024-01-03 NOTE — TELEPHONE ENCOUNTER
Caller: Kaushal Jacobs    Relationship: Self    Best call back number:[phone]     Requested Prescriptions:   Requested Prescriptions     Pending Prescriptions Disp Refills    linagliptin (TRADJENTA) 5 MG tablet tablet 30 tablet 3     Sig: Take 1 tablet by mouth Daily.        Pharmacy where request should be sent: [facility] - [address] [phone] [facility] [phone]      Last office visit with prescribing clinician: 10/3/2023   Last telemedicine visit with prescribing clinician: Visit date not found   Next office visit with prescribing clinician: 4/9/2024     Additional details provided by patient: PATIENT HAS TWO PILLS LEFT AND HAD BEEN FILLING AT [facility] BUT THEY ARE NOT GIVING HIM THE SAME DEAL AND IT IS CHEAPER WITH [facility].     Does the patient have less than a 3 day supply:  [x] Yes  [] No    Would you like a call back once the refill request has been completed: [] Yes [x] No    If the office needs to give you a call back, can they leave a voicemail: [] Yes [x] No    Sveta Ramirez Rep   01/03/24 12:54 EST

## 2024-03-21 RX ORDER — LINAGLIPTIN 5 MG/1
5 TABLET, FILM COATED ORAL DAILY
Qty: 30 TABLET | Refills: 11 | Status: SHIPPED | OUTPATIENT
Start: 2024-03-21

## 2024-04-09 ENCOUNTER — OFFICE VISIT (OUTPATIENT)
Dept: INTERNAL MEDICINE | Facility: CLINIC | Age: 49
End: 2024-04-09
Payer: COMMERCIAL

## 2024-04-09 VITALS
DIASTOLIC BLOOD PRESSURE: 72 MMHG | HEIGHT: 72 IN | WEIGHT: 217.4 LBS | BODY MASS INDEX: 29.45 KG/M2 | SYSTOLIC BLOOD PRESSURE: 130 MMHG

## 2024-04-09 DIAGNOSIS — Z78.9 HISTORY OF FOREIGN TRAVEL: ICD-10-CM

## 2024-04-09 DIAGNOSIS — J45.20 MILD INTERMITTENT ASTHMA WITHOUT COMPLICATION: Chronic | ICD-10-CM

## 2024-04-09 DIAGNOSIS — E11.9 TYPE 2 DIABETES MELLITUS WITHOUT COMPLICATION, WITHOUT LONG-TERM CURRENT USE OF INSULIN: Chronic | ICD-10-CM

## 2024-04-09 DIAGNOSIS — K76.0 FATTY LIVER: ICD-10-CM

## 2024-04-09 DIAGNOSIS — I10 ESSENTIAL HYPERTENSION: Primary | Chronic | ICD-10-CM

## 2024-04-09 DIAGNOSIS — Z12.5 PROSTATE CANCER SCREENING: ICD-10-CM

## 2024-04-09 DIAGNOSIS — Z11.3 SCREENING FOR STD (SEXUALLY TRANSMITTED DISEASE): ICD-10-CM

## 2024-04-09 PROCEDURE — 99214 OFFICE O/P EST MOD 30 MIN: CPT | Performed by: INTERNAL MEDICINE

## 2024-04-09 RX ORDER — DIAZEPAM 10 MG/1
TABLET ORAL
Qty: 4 TABLET | Refills: 0 | Status: SHIPPED | OUTPATIENT
Start: 2024-04-09

## 2024-04-09 NOTE — PROGRESS NOTES
Subjective        Chief Complaint   Patient presents with    Diabetes           Kaushal Jacobs is a 48 y.o. male who presents for    Patient Active Problem List   Diagnosis    Essential hypertension    Other hyperlipidemia    Asthma    Type 2 diabetes mellitus without complication, without long-term current use of insulin    Dyspepsia    Fatty liver    Primary insomnia    Tubular adenoma    ED (erectile dysfunction)       History of Present Illness     His BP runs 130s/70s. He does not check his sugars. He has a Freestyle Klever but he has not started it. He walks twice per week. He has 5-10 drinks per week. His asthma is doing well. He is going to [facility] this [place]. He has a travel clinic appt at .    Allergies   Allergen Reactions    Penicillins      FIB-4 Calculation: 0.56 at 3/27/2024  8:30 AM  Calculated from:  SGOT/AST: 15 U/L at 3/27/2024  8:30 AM  SGPT/ALT: 22 U/L at 3/27/2024  8:30 AM  Platelets: 276 10*3/mm3 at 3/27/2024  8:30 AM  Age: 48 years   Current Outpatient Medications on File Prior to Visit   Medication Sig Dispense Refill    albuterol sulfate  (90 Base) MCG/ACT inhaler TAKE 2 PUFFS BY MOUTH EVERY 4 HOURS AS NEEDED 6.7 g 0    amLODIPine (NORVASC) 10 MG tablet TAKE 1 TABLET BY MOUTH EVERY DAY 90 tablet 2    atorvastatin (LIPITOR) 20 MG tablet Take 1 tablet by mouth Daily. 90 tablet 3    budesonide-formoterol (Symbicort) 80-4.5 MCG/ACT inhaler Inhale 2 puffs 2 (Two) Times a Day. 10.2 each 3    hydroCHLOROthiazide (HYDRODIURIL) 12.5 MG tablet Take 1 tablet by mouth Daily. 90 tablet 1    lisinopril (PRINIVIL,ZESTRIL) 40 MG tablet Take 1 tablet by mouth Daily. 90 tablet 2    metFORMIN (GLUCOPHAGE) 1000 MG tablet Take 1 tablet by mouth 2 (Two) Times a Day. 180 tablet 1    omeprazole (priLOSEC) 20 MG capsule Take 1 capsule by mouth Daily. 90 capsule 2    sildenafil (REVATIO) 20 MG tablet Take 2-3 tabs po 30 minutes before intercourse prn 90 tablet 1    Tradjenta 5 MG tablet tablet TAKE 1 TABLET  DAILY 30 tablet 11    zolpidem (AMBIEN) 10 MG tablet TAKE 1 TABLET BY MOUTH AT NIGHT AS NEEDED FOR SLEEP. 30 tablet 0    Continuous Blood Gluc Sensor (FreeStyle Klever 14 Day Sensor) misc 1 each Every 14 (Fourteen) Days. 2 each 3    Continuous Blood Gluc Sensor (FreeStyle Klever 3 Sensor) misc 1 Device Continuous. 3 each 5     No current facility-administered medications on file prior to visit.       Past Medical History:   Diagnosis Date    Carpal tunnel syndrome     COVID-19 08/2022    GERD (gastroesophageal reflux disease)     Insomnia     Low serum IgA for age 4/16/2021       Past Surgical History:   Procedure Laterality Date    COLONOSCOPY N/A 9/20/2021    Procedure: COLONOSCOPY with polypectomy;  Surgeon: Tee Bassett MD;  [place] [facility] [place];  Service: Gastroenterology;  Laterality: N/A;  polyp, diverticulosis    ENDOSCOPY      KNEE SURGERY Right 2000    ACL    TONSILLECTOMY      UMBILICAL HERNIA REPAIR         Family History   Problem Relation Age of Onset    Hyperlipidemia Mother     Hypertension Mother     Diabetes Mother     Alzheimer's disease Mother     COPD Father     Heart disease Father     Hyperlipidemia Father     Hypertension Father     Liver disease Sister         fatty liver       Social History     Socioeconomic History    Marital status: Single   Tobacco Use    Smoking status: Never    Smokeless tobacco: Never   Vaping Use    Vaping status: Never Used   Substance and Sexual Activity    Alcohol use: Yes     Comment: 2 drinks daily    Drug use: No    Sexual activity: Yes     Partners: Male     Birth control/protection: Condom     Comment: he uses condoms           The following portions of the patient's history were reviewed and updated as appropriate: problem list, allergies, current medications, past medical history, past family history, past social history, and past surgical history.    Review of Systems    Immunization History   Administered Date(s) Administered    COVID-19  "(PFIZER) Purple Cap Monovalent 02/28/2021, 03/21/2021, 10/01/2021    Fluzone (or Fluarix & Flulaval for VFC) >6mos 09/24/2020, 10/15/2021, 12/06/2022, 10/03/2023    Fluzone Quad >6mos (Multi-dose) 09/16/2016, 11/20/2018    Hepatitis A 01/01/2011    Hepatitis B Adult/Adolescent IM 01/01/2011    Influenza, Unspecified 12/06/2022    MMR 06/10/1994    Pneumococcal Polysaccharide (PPSV23) 09/24/2020    Tdap 01/01/2009, 06/10/2019    Typhoid, Unspecified 01/01/2016       Objective   Vitals:    04/09/24 0801   BP: 130/72   Weight: 98.6 kg (217 lb 6.4 oz)   Height: 182.9 cm (72.01\")     Body mass index is 29.48 kg/m².  Physical Exam  Vitals reviewed.   Constitutional:       Appearance: He is well-developed.   HENT:      Head: Normocephalic and atraumatic.   Cardiovascular:      Rate and Rhythm: Normal rate and regular rhythm.      Heart sounds: Normal heart sounds, S1 normal and S2 normal.   Pulmonary:      Effort: Pulmonary effort is normal.      Breath sounds: Normal breath sounds.   Skin:     General: Skin is warm.   Neurological:      Mental Status: He is alert.   Psychiatric:         Behavior: Behavior normal.         Procedures    Assessment & Plan   Diagnoses and all orders for this visit:    1. Essential hypertension (Primary)  -     Lipid Panel With / Chol / HDL Ratio; Future    2. Type 2 diabetes mellitus without complication, without long-term current use of insulin  -     Hemoglobin A1c; Future  -     Microalbumin / Creatinine Urine Ratio - Urine, Clean Catch; Future    3. Fatty liver  -     Comprehensive Metabolic Panel; Future    4. Mild intermittent asthma without complication  Comments:  stable    5. Prostate cancer screening  -     PSA Screen; Future    6. Screening for STD (sexually transmitted disease)  -     HIV-1 / O / 2 Ag / Antibody; Future  -     RPR; Future  -     Hepatitis B Surface Antigen; Future  -     Hepatitis B Surface Antibody; Future  -     Hepatitis B Core Antibody, Total; Future  -     " Chlamydia trachomatis, Neisseria gonorrhoeae, PCR - , Urine, Clean Catch; Future    7. History of foreign travel  -     diazePAM (Valium) 10 MG tablet; Take one po 30 minutes before international flight  Dispense: 4 tablet; Refill: 0          Reviewed cmp and A1c. Recc use Free style corwin. BP is good. Valium for international flight. To see  travel clinic as well.          Return in about 6 months (around 10/9/2024) for Annual physical, Lab Before FUP.

## 2024-05-22 DIAGNOSIS — E11.9 TYPE 2 DIABETES MELLITUS WITHOUT COMPLICATION, WITHOUT LONG-TERM CURRENT USE OF INSULIN: Chronic | ICD-10-CM

## 2024-05-22 DIAGNOSIS — I10 ESSENTIAL HYPERTENSION: Chronic | ICD-10-CM

## 2024-05-22 RX ORDER — HYDROCHLOROTHIAZIDE 12.5 MG/1
12.5 TABLET ORAL DAILY
Qty: 90 TABLET | Refills: 3 | Status: SHIPPED | OUTPATIENT
Start: 2024-05-22

## 2024-07-15 ENCOUNTER — OFFICE VISIT (OUTPATIENT)
Dept: INTERNAL MEDICINE | Facility: CLINIC | Age: 49
End: 2024-07-15
Payer: COMMERCIAL

## 2024-07-15 VITALS
DIASTOLIC BLOOD PRESSURE: 70 MMHG | HEIGHT: 72 IN | BODY MASS INDEX: 30.07 KG/M2 | HEART RATE: 102 BPM | WEIGHT: 222 LBS | OXYGEN SATURATION: 95 % | SYSTOLIC BLOOD PRESSURE: 140 MMHG

## 2024-07-15 DIAGNOSIS — L03.012 PARONYCHIA OF FINGER, LEFT: ICD-10-CM

## 2024-07-15 DIAGNOSIS — J45.901 EXACERBATION OF ASTHMA, UNSPECIFIED ASTHMA SEVERITY, UNSPECIFIED WHETHER PERSISTENT: Primary | ICD-10-CM

## 2024-07-15 PROCEDURE — 99213 OFFICE O/P EST LOW 20 MIN: CPT | Performed by: INTERNAL MEDICINE

## 2024-07-15 RX ORDER — PREDNISONE 20 MG/1
TABLET ORAL
Qty: 10 TABLET | Refills: 0 | Status: SHIPPED | OUTPATIENT
Start: 2024-07-15

## 2024-07-15 RX ORDER — ALBUTEROL SULFATE 90 UG/1
2 AEROSOL, METERED RESPIRATORY (INHALATION) EVERY 4 HOURS PRN
Qty: 6.7 G | Refills: 0 | Status: SHIPPED | OUTPATIENT
Start: 2024-07-15

## 2024-07-15 RX ORDER — CEPHALEXIN 500 MG/1
500 CAPSULE ORAL 3 TIMES DAILY
Qty: 21 CAPSULE | Refills: 0 | Status: SHIPPED | OUTPATIENT
Start: 2024-07-15

## 2024-07-15 NOTE — PROGRESS NOTES
Subjective        Chief Complaint   Patient presents with    Hand Pain     Infection middle finger (right)            Kaushal Jacobs is a 48 y.o. male who presents for    Patient Active Problem List   Diagnosis    Essential hypertension    Other hyperlipidemia    Asthma    Type 2 diabetes mellitus without complication, without long-term current use of insulin    Dyspepsia    Fatty liver    Primary insomnia    Tubular adenoma    ED (erectile dysfunction)       History of Present Illness     He went to [facility] on safari. He was exposed to a lot of dust. He has been wheezing and coughing. He is using his rescue inhaler 4 times per day since Saturday. He denies fever. He has been using mucinex. His sputum was yellow/clear. His left third finger was painful over the weekend. He did not injure it.    Allergies   Allergen Reactions    Penicillins        Current Outpatient Medications on File Prior to Visit   Medication Sig Dispense Refill    amLODIPine (NORVASC) 10 MG tablet TAKE 1 TABLET BY MOUTH EVERY DAY 90 tablet 2    atorvastatin (LIPITOR) 20 MG tablet Take 1 tablet by mouth Daily. 90 tablet 3    budesonide-formoterol (Symbicort) 80-4.5 MCG/ACT inhaler Inhale 2 puffs 2 (Two) Times a Day. 10.2 each 3    Continuous Blood Gluc Sensor (FreeStyle Klever 14 Day Sensor) misc 1 each Every 14 (Fourteen) Days. 2 each 3    Continuous Blood Gluc Sensor (FreeStyle Klever 3 Sensor) misc 1 Device Continuous. 3 each 5    hydroCHLOROthiazide 12.5 MG tablet TAKE 1 TABLET DAILY 90 tablet 3    lisinopril (PRINIVIL,ZESTRIL) 40 MG tablet Take 1 tablet by mouth Daily. 90 tablet 2    metFORMIN (GLUCOPHAGE) 1000 MG tablet TAKE 1 TABLET TWICE A  tablet 3    omeprazole (priLOSEC) 20 MG capsule Take 1 capsule by mouth Daily. 90 capsule 2    sildenafil (REVATIO) 20 MG tablet Take 2-3 tabs po 30 minutes before intercourse prn 90 tablet 1    Tradjenta 5 MG tablet tablet TAKE 1 TABLET DAILY 30 tablet 11    zolpidem (AMBIEN) 10 MG tablet TAKE 1  TABLET BY MOUTH AT NIGHT AS NEEDED FOR SLEEP. 30 tablet 0    [DISCONTINUED] albuterol sulfate  (90 Base) MCG/ACT inhaler TAKE 2 PUFFS BY MOUTH EVERY 4 HOURS AS NEEDED 6.7 g 0    [DISCONTINUED] diazePAM (Valium) 10 MG tablet Take one po 30 minutes before international flight 4 tablet 0     No current facility-administered medications on file prior to visit.       Past Medical History:   Diagnosis Date    Carpal tunnel syndrome     COVID-19 08/2022    GERD (gastroesophageal reflux disease)     Insomnia     Low serum IgA for age 4/16/2021       Past Surgical History:   Procedure Laterality Date    COLONOSCOPY N/A 9/20/2021    Procedure: COLONOSCOPY with polypectomy;  Surgeon: Tee Bassett MD;  Location: [facility] MA[facility] OR;  Service: Gastroenterology;  Laterality: N/A;  polyp, diverticulosis    ENDOSCOPY      KNEE SURGERY Right 2000    ACL    TONSILLECTOMY      UMBILICAL HERNIA REPAIR         Family History   Problem Relation Age of Onset    Hyperlipidemia Mother     Hypertension Mother     Diabetes Mother     Alzheimer's disease Mother     COPD Father     Heart disease Father     Hyperlipidemia Father     Hypertension Father     Liver disease Sister         fatty liver       Social History     Socioeconomic History    Marital status: Single   Tobacco Use    Smoking status: Never    Smokeless tobacco: Never   Vaping Use    Vaping status: Never Used   Substance and Sexual Activity    Alcohol use: Yes     Comment: 2 drinks daily    Drug use: No    Sexual activity: Yes     Partners: Male     Birth control/protection: Condom     Comment: he uses condoms           The following portions of the patient's history were reviewed and updated as appropriate: problem list, allergies, current medications, past medical history, past family history, past social history, and past surgical history.    Review of Systems    Immunization History   Administered Date(s) Administered    COVID-19 (PFIZER) Purple Cap Monovalent  "02/28/2021, 03/21/2021, 10/01/2021    Fluzone (or Fluarix & Flulaval for VFC) >6mos 09/24/2020, 10/15/2021, 12/06/2022, 10/03/2023    Fluzone Quad >6mos (Multi-dose) 09/16/2016, 11/20/2018    Hepatitis A 01/01/2011    Hepatitis B Adult/Adolescent IM 01/01/2011    Influenza, Unspecified 12/06/2022    MMR 06/10/1994    Pneumococcal Polysaccharide (PPSV23) 09/24/2020    Tdap 01/01/2009, 06/10/2019    Typhoid, Unspecified 01/01/2016       Objective   Vitals:    07/15/24 1639   BP: 140/70   Pulse: 102   SpO2: 95%   Weight: 101 kg (222 lb)   Height: 182.9 cm (72.01\")     Body mass index is 30.1 kg/m².  Physical Exam  Vitals reviewed.   Constitutional:       Appearance: He is well-developed.   HENT:      Head: Normocephalic and atraumatic.      Mouth/Throat:      Mouth: Mucous membranes are moist.      Pharynx: Oropharynx is clear.   Cardiovascular:      Rate and Rhythm: Normal rate and regular rhythm.      Heart sounds: Normal heart sounds, S1 normal and S2 normal.   Pulmonary:      Effort: Pulmonary effort is normal.      Comments: Diffuse inspiratory and expiratory wheezes  Skin:     General: Skin is warm.      Comments: Lateral aspect left third fingernail with light amount of yellow. Non tender.    Neurological:      Mental Status: He is alert.   Psychiatric:         Behavior: Behavior normal.         Procedures    Assessment & Plan   Diagnoses and all orders for this visit:    1. Exacerbation of asthma, unspecified asthma severity, unspecified whether persistent (Primary)  -     cephalexin (Keflex) 500 MG capsule; Take 1 capsule by mouth 3 (Three) Times a Day.  Dispense: 21 capsule; Refill: 0  -     predniSONE (DELTASONE) 20 MG tablet; Take 2 tabs po daily for 5 days  Dispense: 10 tablet; Refill: 0  -     albuterol sulfate  (90 Base) MCG/ACT inhaler; Inhale 2 puffs Every 4 (Four) Hours As Needed for Wheezing.  Dispense: 6.7 g; Refill: 0    2. Paronychia of finger, left  -     cephalexin (Keflex) 500 MG " capsule; Take 1 capsule by mouth 3 (Three) Times a Day.  Dispense: 21 capsule; Refill: 0          He will also soak his left third finger. Discussed infrequent allergy to Keflex if had to PCN.         No follow-ups on file.

## 2024-08-06 DIAGNOSIS — J45.901 EXACERBATION OF ASTHMA, UNSPECIFIED ASTHMA SEVERITY, UNSPECIFIED WHETHER PERSISTENT: ICD-10-CM

## 2024-08-06 RX ORDER — ALBUTEROL SULFATE 90 UG/1
2 AEROSOL, METERED RESPIRATORY (INHALATION) EVERY 4 HOURS PRN
Qty: 6.7 G | Refills: 0 | Status: SHIPPED | OUTPATIENT
Start: 2024-08-06

## 2024-08-20 DIAGNOSIS — R10.13 DYSPEPSIA: ICD-10-CM

## 2024-08-20 DIAGNOSIS — I10 ESSENTIAL HYPERTENSION: Chronic | ICD-10-CM

## 2024-08-20 RX ORDER — OMEPRAZOLE 20 MG/1
20 CAPSULE, DELAYED RELEASE ORAL DAILY
Qty: 90 CAPSULE | Refills: 3 | Status: SHIPPED | OUTPATIENT
Start: 2024-08-20

## 2024-08-20 RX ORDER — AMLODIPINE BESYLATE 10 MG/1
10 TABLET ORAL DAILY
Qty: 90 TABLET | Refills: 3 | Status: SHIPPED | OUTPATIENT
Start: 2024-08-20

## 2024-08-20 RX ORDER — LISINOPRIL 40 MG/1
40 TABLET ORAL DAILY
Qty: 90 TABLET | Refills: 3 | Status: SHIPPED | OUTPATIENT
Start: 2024-08-20

## 2024-10-02 ENCOUNTER — TELEPHONE (OUTPATIENT)
Dept: INTERNAL MEDICINE | Facility: CLINIC | Age: 49
End: 2024-10-02
Payer: COMMERCIAL

## 2024-10-02 DIAGNOSIS — Z12.5 PROSTATE CANCER SCREENING: ICD-10-CM

## 2024-10-02 DIAGNOSIS — Z11.59 NEED FOR HEPATITIS C SCREENING TEST: ICD-10-CM

## 2024-10-02 DIAGNOSIS — E11.9 TYPE 2 DIABETES MELLITUS WITHOUT COMPLICATION, WITHOUT LONG-TERM CURRENT USE OF INSULIN: Primary | ICD-10-CM

## 2024-10-02 DIAGNOSIS — Z11.3 SCREENING FOR STD (SEXUALLY TRANSMITTED DISEASE): ICD-10-CM

## 2024-10-02 NOTE — TELEPHONE ENCOUNTER
Caller: Kaushal Jacobs    Relationship: Self    Best call back number: [phone]     What orders are you requesting (i.e. lab or imaging): HEP C LAB    In what timeframe would the patient need to come in: 10/03/24    Where will you receive your lab/imaging services: [facility]    Additional notes: PATIENT IS CALLING TO ASK IF DR REVELES WILL ADD THE HEP C LAB ORDER.    PLEASE ADVISE.

## 2024-10-05 LAB
ALBUMIN SERPL-MCNC: 4.5 G/DL (ref 4.1–5.1)
ALBUMIN/CREAT UR: 8 MG/G CREAT (ref 0–29)
ALP SERPL-CCNC: 49 IU/L (ref 44–121)
ALT SERPL-CCNC: 45 IU/L (ref 0–44)
AST SERPL-CCNC: 28 IU/L (ref 0–40)
BILIRUB SERPL-MCNC: 0.5 MG/DL (ref 0–1.2)
BUN SERPL-MCNC: 16 MG/DL (ref 6–24)
BUN/CREAT SERPL: 19 (ref 9–20)
C TRACH RRNA SPEC QL NAA+PROBE: NEGATIVE
CALCIUM SERPL-MCNC: 9.4 MG/DL (ref 8.7–10.2)
CHLORIDE SERPL-SCNC: 101 MMOL/L (ref 96–106)
CHOLEST SERPL-MCNC: 133 MG/DL (ref 100–199)
CHOLEST/HDLC SERPL: 3.5 RATIO (ref 0–5)
CO2 SERPL-SCNC: 22 MMOL/L (ref 20–29)
CREAT SERPL-MCNC: 0.85 MG/DL (ref 0.76–1.27)
CREAT UR-MCNC: 148.3 MG/DL
EGFRCR SERPLBLD CKD-EPI 2021: 107 ML/MIN/1.73
GLOBULIN SER CALC-MCNC: 2.3 G/DL (ref 1.5–4.5)
GLUCOSE SERPL-MCNC: 156 MG/DL (ref 70–99)
HBA1C MFR BLD: 7.8 % (ref 4.8–5.6)
HBV CORE AB SERPL QL IA: NEGATIVE
HBV SURFACE AB SER QL: REACTIVE
HBV SURFACE AG SERPL QL IA: NEGATIVE
HCV IGG SERPL QL IA: NON REACTIVE
HDLC SERPL-MCNC: 38 MG/DL
HIV 1+2 AB+HIV1 P24 AG SERPL QL IA: NON REACTIVE
LDLC SERPL CALC-MCNC: 64 MG/DL (ref 0–99)
MICROALBUMIN UR-MCNC: 11.4 UG/ML
N GONORRHOEA RRNA SPEC QL NAA+PROBE: NEGATIVE
POTASSIUM SERPL-SCNC: 4.5 MMOL/L (ref 3.5–5.2)
PROT SERPL-MCNC: 6.8 G/DL (ref 6–8.5)
PSA SERPL-MCNC: 1.1 NG/ML (ref 0–4)
RPR SER QL: NON REACTIVE
SODIUM SERPL-SCNC: 139 MMOL/L (ref 134–144)
TRIGL SERPL-MCNC: 188 MG/DL (ref 0–149)
VLDLC SERPL CALC-MCNC: 31 MG/DL (ref 5–40)

## 2024-10-10 ENCOUNTER — OFFICE VISIT (OUTPATIENT)
Dept: INTERNAL MEDICINE | Facility: CLINIC | Age: 49
End: 2024-10-10
Payer: COMMERCIAL

## 2024-10-10 VITALS
SYSTOLIC BLOOD PRESSURE: 124 MMHG | HEIGHT: 72 IN | BODY MASS INDEX: 30.23 KG/M2 | DIASTOLIC BLOOD PRESSURE: 80 MMHG | WEIGHT: 223.2 LBS

## 2024-10-10 DIAGNOSIS — Z23 NEED FOR INFLUENZA VACCINATION: ICD-10-CM

## 2024-10-10 DIAGNOSIS — E78.00 HIGH CHOLESTEROL: ICD-10-CM

## 2024-10-10 DIAGNOSIS — E11.9 TYPE 2 DIABETES MELLITUS WITHOUT COMPLICATION, WITHOUT LONG-TERM CURRENT USE OF INSULIN: Chronic | ICD-10-CM

## 2024-10-10 DIAGNOSIS — I10 ESSENTIAL HYPERTENSION: Primary | Chronic | ICD-10-CM

## 2024-10-10 PROCEDURE — 90656 IIV3 VACC NO PRSV 0.5 ML IM: CPT | Performed by: INTERNAL MEDICINE

## 2024-10-10 PROCEDURE — 99214 OFFICE O/P EST MOD 30 MIN: CPT | Performed by: INTERNAL MEDICINE

## 2024-10-10 PROCEDURE — 90471 IMMUNIZATION ADMIN: CPT | Performed by: INTERNAL MEDICINE

## 2024-10-10 RX ORDER — BLOOD-GLUCOSE SENSOR
1 EACH MISCELLANEOUS CONTINUOUS
Qty: 3 EACH | Refills: 5 | Status: SHIPPED | OUTPATIENT
Start: 2024-10-10

## 2024-10-10 RX ORDER — BETAMETHASONE DIPROPIONATE 0.5 MG/G
CREAM TOPICAL
COMMUNITY
Start: 2024-09-26

## 2024-10-10 NOTE — PROGRESS NOTES
Subjective        Chief Complaint   Patient presents with    Diabetes           Kaushal Jacobs is a 48 y.o. male who presents for    Patient Active Problem List   Diagnosis    Essential hypertension    High cholesterol    Asthma    Type 2 diabetes mellitus without complication, without long-term current use of insulin    Dyspepsia    Fatty liver    Primary insomnia    Tubular adenoma    ED (erectile dysfunction)       History of Present Illness       He still eats out a lot. He does not check his sugars and he does not exercise. He has 2 drinks 4-5 times per week. Denies chest pain, dyspnea, nausea or abdominal pain. He was diagnosed with lichen planus with derm.  Allergies   Allergen Reactions    Penicillins      BMI is >= 30 and <35. (Class 1 Obesity). The following options were offered after discussion;: exercise counseling/recommendations   Current Outpatient Medications on File Prior to Visit   Medication Sig Dispense Refill    albuterol sulfate  (90 Base) MCG/ACT inhaler TAKE 2 PUFFS BY MOUTH EVERY 4 HOURS AS NEEDED FOR WHEEZE 6.7 g 0    amLODIPine (NORVASC) 10 MG tablet TAKE 1 TABLET DAILY 90 tablet 3    atorvastatin (LIPITOR) 20 MG tablet Take 1 tablet by mouth Daily. 90 tablet 3    betamethasone dipropionate (DIPROSONE) 0.05 % cream APPLY TO RASH ON LEFT THIGH AND TRUNK TWICE A DAY FOR 2 WEEKS, THEN EVERY OTHER DAY AS NEEDED      budesonide-formoterol (Symbicort) 80-4.5 MCG/ACT inhaler Inhale 2 puffs 2 (Two) Times a Day. 10.2 each 3    Continuous Blood Gluc Sensor (FreeStyle Klever 14 Day Sensor) misc 1 each Every 14 (Fourteen) Days. 2 each 3    hydroCHLOROthiazide 12.5 MG tablet TAKE 1 TABLET DAILY 90 tablet 3    lisinopril (PRINIVIL,ZESTRIL) 40 MG tablet TAKE 1 TABLET DAILY 90 tablet 3    metFORMIN (GLUCOPHAGE) 1000 MG tablet TAKE 1 TABLET TWICE A  tablet 3    omeprazole (priLOSEC) 20 MG capsule TAKE 1 CAPSULE DAILY 90 capsule 3    sildenafil (REVATIO) 20 MG tablet Take 2-3 tabs po 30 minutes  before intercourse prn 90 tablet 1    Tradjenta 5 MG tablet tablet TAKE 1 TABLET DAILY 30 tablet 11    zolpidem (AMBIEN) 10 MG tablet TAKE 1 TABLET BY MOUTH AT NIGHT AS NEEDED FOR SLEEP. 30 tablet 0    [DISCONTINUED] cephalexin (Keflex) 500 MG capsule Take 1 capsule by mouth 3 (Three) Times a Day. 21 capsule 0    [DISCONTINUED] Continuous Blood Gluc Sensor (FreeStyle Klever 3 Sensor) misc 1 Device Continuous. 3 each 5    [DISCONTINUED] predniSONE (DELTASONE) 20 MG tablet Take 2 tabs po daily for 5 days 10 tablet 0     No current facility-administered medications on file prior to visit.       Past Medical History:   Diagnosis Date    Carpal tunnel syndrome     COVID-19 08/2022    GERD (gastroesophageal reflux disease)     Insomnia     Lichen planus     Low serum IgA for age 04/16/2021       Past Surgical History:   Procedure Laterality Date    COLONOSCOPY N/A 9/20/2021    Procedure: COLONOSCOPY with polypectomy;  Surgeon: Tee Bassett MD;  Location: [facility] MAIN OR;  Service: Gastroenterology;  Laterality: N/A;  polyp, diverticulosis    ENDOSCOPY      KNEE SURGERY Right 2000    ACL    TONSILLECTOMY      UMBILICAL HERNIA REPAIR         Family History   Problem Relation Age of Onset    Hyperlipidemia Mother     Hypertension Mother     Diabetes Mother     Alzheimer's disease Mother     COPD Father     Heart disease Father     Hyperlipidemia Father     Hypertension Father     Liver disease Sister         fatty liver       Social History     Socioeconomic History    Marital status: Single   Tobacco Use    Smoking status: Never    Smokeless tobacco: Never   Vaping Use    Vaping status: Never Used   Substance and Sexual Activity    Alcohol use: Yes     Comment: 2 drinks daily    Drug use: No    Sexual activity: Yes     Partners: Male     Birth control/protection: Condom     Comment: he uses condoms           The following portions of the patient's history were reviewed and updated as appropriate: problem list,  "allergies, current medications, past medical history, past family history, past social history, and past surgical history.    Review of Systems    Immunization History   Administered Date(s) Administered    COVID-19 (PFIZER) Purple Cap Monovalent 02/28/2021, 03/21/2021, 10/01/2021    Fluzone  >6mos 10/10/2024    Fluzone (or Fluarix & Flulaval for VFC) >6mos 09/24/2020, 10/15/2021, 12/06/2022, 10/03/2023    Fluzone Quad >6mos (Multi-dose) 09/16/2016, 11/20/2018    Hepatitis A 01/01/2011    Hepatitis B Adult/Adolescent IM 01/01/2011    Influenza, Unspecified 12/06/2022    MMR 06/10/1994    Pneumococcal Polysaccharide (PPSV23) 09/24/2020    Tdap 01/01/2009, 06/10/2019    Typhoid, Unspecified 01/01/2016       Objective   Vitals:    10/10/24 0846   BP: 124/80   Weight: 101 kg (223 lb 3.2 oz)   Height: 182.9 cm (72.01\")     Body mass index is 30.26 kg/m².  Physical Exam  Vitals reviewed.   Constitutional:       Appearance: He is well-developed.   HENT:      Head: Normocephalic and atraumatic.   Cardiovascular:      Rate and Rhythm: Normal rate and regular rhythm.      Heart sounds: Normal heart sounds, S1 normal and S2 normal.   Pulmonary:      Effort: Pulmonary effort is normal.      Breath sounds: Normal breath sounds.   Skin:     General: Skin is warm.   Neurological:      Mental Status: He is alert.   Psychiatric:         Behavior: Behavior normal.         Procedures    Assessment & Plan   Diagnoses and all orders for this visit:    1. Essential hypertension (Primary)  Comments:  BP is good    2. Type 2 diabetes mellitus without complication, without long-term current use of insulin  -     Continuous Glucose Sensor (FreeStyle Klever 3 Sensor) misc; Use 1 Device Continuous.  Dispense: 3 each; Refill: 5  -     empagliflozin (Jardiance) 10 MG tablet tablet; Take 1 tablet by mouth Daily.  Dispense: 90 tablet; Refill: 4    3. High cholesterol  Comments:  LDL at goal. Recc exercise to get HDL to increase    4. Need for " influenza vaccination  -     Fluzone >6mos               Reviewed hep c, hep b, hiv, rpr, cmp, flp, A1c and psa. Add jardiance; discussed risk UTI and yeast. I would like to see him exercise, eat out less and decrease etoh. Flu shot today.    Return in about 3 months (around 1/10/2025) for Annual physical, Lab Before FUP.

## 2024-10-28 ENCOUNTER — TELEPHONE (OUTPATIENT)
Dept: INTERNAL MEDICINE | Facility: CLINIC | Age: 49
End: 2024-10-28
Payer: COMMERCIAL

## 2024-10-28 NOTE — TELEPHONE ENCOUNTER
Caller: Kaushal Jacobs    Relationship: Self    Best call back number: [phone]     What medication are you requesting: GLUCOSE MONITOR, NEEDLES, STRIPS      If a prescription is needed, what is your preferred pharmacy and phone number: [id number] - [place], [place] - [address] AT [address]AR [facility] & [address] [facility] - [phone]  - [phone] FX     Additional notes: PATIENT IS NEEDING A GLUCOSE MONITOR TO HELP SPOT CHECK DURING THE DAY. HE CURRENTLY IS USING A CONTINUOUS MONITOR AS WELL.

## 2024-11-15 ENCOUNTER — TELEPHONE (OUTPATIENT)
Dept: INTERNAL MEDICINE | Facility: CLINIC | Age: 49
End: 2024-11-15
Payer: COMMERCIAL

## 2024-11-15 NOTE — TELEPHONE ENCOUNTER
Caller: Kaushal Jacobs    Relationship: Self    Best call back number: [phone]     What is the medical concern/diagnosis: SCREENING    What specialty or service is being requested: CORONARY CALCIUM CT      What is the office location: [facility]        Any additional details: PATIENT STATES CAN GET THROUGH [facility] FOR $99.00.  PHONE NUMBER [phone].    PLEASE ADVISE.

## 2024-11-18 DIAGNOSIS — E78.49 OTHER HYPERLIPIDEMIA: ICD-10-CM

## 2024-11-18 RX ORDER — ATORVASTATIN CALCIUM 20 MG/1
20 TABLET, FILM COATED ORAL DAILY
Qty: 90 TABLET | Refills: 3 | Status: SHIPPED | OUTPATIENT
Start: 2024-11-18

## 2024-12-11 ENCOUNTER — TELEPHONE (OUTPATIENT)
Dept: INTERNAL MEDICINE | Facility: CLINIC | Age: 49
End: 2024-12-11
Payer: COMMERCIAL

## 2024-12-13 NOTE — TELEPHONE ENCOUNTER
Caller: Kaushal Jacobs    Relationship to patient: Self      Best call back number: [phone]     Provider: MERISSA REVELES    Medication PA needed: Tradjenta 5 MG tablet tablet     Reason for call/Prior Auth: PATIENT CALLING STATING THAT THE PA  ON 10/17/24. PATIENT WOULD LIKE THIS TO BE MARKED AS URGENT DUE TO BEING LOW ON MEDICATION.    PLEASE CALL PATIENT TO LET HIM KNOW WHEN THIS IS COMPLETED.    
Do you know anything about this PA  
Dr Lawson sent in Rx to start PA   
Pt was informed   
Abdomen soft, non-tender, no guarding.

## 2024-12-17 ENCOUNTER — TELEPHONE (OUTPATIENT)
Dept: INTERNAL MEDICINE | Facility: CLINIC | Age: 49
End: 2024-12-17
Payer: COMMERCIAL

## 2024-12-17 NOTE — TELEPHONE ENCOUNTER
Caller: Kaushal Jacobs    Relationship to patient: Self      Best call back number: [phone]    Provider:     Medication PA needed: linagliptin (Tradjenta) 5 MG tablet tablet [[id number]] (Order [id number])     Reason for call/Prior Auth:     PATIENT CALLED IN REQUESTING A PRIOR AUTHORIZATION FOR THIS MEDICATION.   PATIENT PUT US ON A 3-WAY CALL AND THE INSURANCE COMPANY GAVE THIS NUMBER FOR  TO CALL REGARDING THE PA.     [phone]    PLEASE ADVISE.

## 2025-01-03 ENCOUNTER — TELEPHONE (OUTPATIENT)
Dept: INTERNAL MEDICINE | Facility: CLINIC | Age: 50
End: 2025-01-03
Payer: COMMERCIAL

## 2025-01-07 DIAGNOSIS — J45.20 MILD INTERMITTENT ASTHMA WITHOUT COMPLICATION: ICD-10-CM

## 2025-01-07 RX ORDER — BUDESONIDE AND FORMOTEROL FUMARATE DIHYDRATE 80; 4.5 UG/1; UG/1
2 AEROSOL RESPIRATORY (INHALATION) 2 TIMES DAILY
Qty: 10.2 EACH | Refills: 3 | Status: SHIPPED | OUTPATIENT
Start: 2025-01-07

## 2025-01-10 DIAGNOSIS — E11.9 TYPE 2 DIABETES MELLITUS WITHOUT COMPLICATION, WITHOUT LONG-TERM CURRENT USE OF INSULIN: Primary | ICD-10-CM

## 2025-01-10 LAB
ALBUMIN SERPL-MCNC: 4.3 G/DL (ref 3.5–5.2)
ALBUMIN/GLOB SERPL: 1.5 G/DL
ALP SERPL-CCNC: 51 U/L (ref 39–117)
ALT SERPL-CCNC: 31 U/L (ref 1–41)
AST SERPL-CCNC: 20 U/L (ref 1–40)
BILIRUB SERPL-MCNC: 0.5 MG/DL (ref 0–1.2)
BUN SERPL-MCNC: 19 MG/DL (ref 6–20)
BUN/CREAT SERPL: 20.9 (ref 7–25)
CALCIUM SERPL-MCNC: 9.5 MG/DL (ref 8.6–10.5)
CHLORIDE SERPL-SCNC: 103 MMOL/L (ref 98–107)
CO2 SERPL-SCNC: 22.6 MMOL/L (ref 22–29)
CREAT SERPL-MCNC: 0.91 MG/DL (ref 0.76–1.27)
EGFRCR SERPLBLD CKD-EPI 2021: 103.3 ML/MIN/1.73
GLOBULIN SER CALC-MCNC: 2.8 GM/DL
GLUCOSE SERPL-MCNC: 131 MG/DL (ref 65–99)
HBA1C MFR BLD: 6.7 % (ref 4.8–5.6)
POTASSIUM SERPL-SCNC: 4.2 MMOL/L (ref 3.5–5.2)
PROT SERPL-MCNC: 7.1 G/DL (ref 6–8.5)
SODIUM SERPL-SCNC: 139 MMOL/L (ref 136–145)

## 2025-01-14 ENCOUNTER — OFFICE VISIT (OUTPATIENT)
Dept: INTERNAL MEDICINE | Facility: CLINIC | Age: 50
End: 2025-01-14
Payer: COMMERCIAL

## 2025-01-14 VITALS
SYSTOLIC BLOOD PRESSURE: 130 MMHG | TEMPERATURE: 97.1 F | HEIGHT: 72 IN | BODY MASS INDEX: 29.39 KG/M2 | WEIGHT: 217 LBS | DIASTOLIC BLOOD PRESSURE: 74 MMHG

## 2025-01-14 DIAGNOSIS — K76.0 FATTY LIVER: ICD-10-CM

## 2025-01-14 DIAGNOSIS — I10 ESSENTIAL HYPERTENSION: Chronic | ICD-10-CM

## 2025-01-14 DIAGNOSIS — E11.9 TYPE 2 DIABETES MELLITUS WITHOUT COMPLICATION, WITHOUT LONG-TERM CURRENT USE OF INSULIN: Primary | ICD-10-CM

## 2025-01-14 DIAGNOSIS — J45.20 MILD INTERMITTENT ASTHMA WITHOUT COMPLICATION: Chronic | ICD-10-CM

## 2025-01-14 DIAGNOSIS — E78.00 HIGH CHOLESTEROL: ICD-10-CM

## 2025-01-14 DIAGNOSIS — D36.9 TUBULAR ADENOMA: ICD-10-CM

## 2025-01-14 DIAGNOSIS — Z13.6 ENCOUNTER FOR SCREENING FOR CORONARY ARTERY DISEASE: ICD-10-CM

## 2025-01-14 DIAGNOSIS — E11.9 TYPE 2 DIABETES MELLITUS WITHOUT COMPLICATION, WITHOUT LONG-TERM CURRENT USE OF INSULIN: Chronic | ICD-10-CM

## 2025-01-14 DIAGNOSIS — Z00.00 WELLNESS EXAMINATION: Primary | ICD-10-CM

## 2025-01-14 PROCEDURE — 99396 PREV VISIT EST AGE 40-64: CPT | Performed by: INTERNAL MEDICINE

## 2025-01-14 NOTE — PROGRESS NOTES
Subjective        Chief Complaint   Patient presents with    Annual Exam           Kaushal Jacobs is a 49 y.o. male who presents for    Patient Active Problem List   Diagnosis    Essential hypertension    High cholesterol    Asthma    Type 2 diabetes mellitus without complication, without long-term current use of insulin    Dyspepsia    Fatty liver    Primary insomnia    Tubular adenoma    ED (erectile dysfunction)       History of Present Illness     He is using a CGM and he is decreasing his carbs. He is not exercising. Denies chest pain or dyspnea. His BP runs 130-140/80. He r/s his symbicort and his wheezing is better. He needed albuterol daily when he was not using symbicort. He has 1-2 drinks per day. He is sexually active with condoms. He injured his back bending over at home; he is seeing a chiropractor. He would like a refill on flexeril that he used in the past.    Allergies   Allergen Reactions    Penicillins        Current Outpatient Medications on File Prior to Visit   Medication Sig Dispense Refill    albuterol sulfate  (90 Base) MCG/ACT inhaler TAKE 2 PUFFS BY MOUTH EVERY 4 HOURS AS NEEDED FOR WHEEZE 6.7 g 0    amLODIPine (NORVASC) 10 MG tablet TAKE 1 TABLET DAILY 90 tablet 3    atorvastatin (LIPITOR) 20 MG tablet TAKE 1 TABLET DAILY 90 tablet 3    budesonide-formoterol (Symbicort) 80-4.5 MCG/ACT inhaler Inhale 2 puffs 2 (Two) Times a Day. 10.2 each 3    Continuous Blood Gluc Sensor (FreeStyle Klever 14 Day Sensor) misc 1 each Every 14 (Fourteen) Days. 2 each 3    Continuous Glucose Sensor (FreeStyle Klever 3 Sensor) misc Use 1 Device Continuous. 3 each 5    hydroCHLOROthiazide 12.5 MG tablet TAKE 1 TABLET DAILY 90 tablet 3    lisinopril (PRINIVIL,ZESTRIL) 40 MG tablet TAKE 1 TABLET DAILY 90 tablet 3    metFORMIN (GLUCOPHAGE) 1000 MG tablet TAKE 1 TABLET TWICE A  tablet 3    omeprazole (priLOSEC) 20 MG capsule TAKE 1 CAPSULE DAILY 90 capsule 3    sildenafil (REVATIO) 20 MG tablet Take 2-3  tabs po 30 minutes before intercourse prn 90 tablet 1    [DISCONTINUED] empagliflozin (Jardiance) 10 MG tablet tablet Take 1 tablet by mouth Daily. 90 tablet 4    [DISCONTINUED] linagliptin (Tradjenta) 5 MG tablet tablet Take 1 tablet by mouth Daily. 30 tablet 11    zolpidem (AMBIEN) 10 MG tablet TAKE 1 TABLET BY MOUTH AT NIGHT AS NEEDED FOR SLEEP. (Patient not taking: Reported on 1/14/2025) 30 tablet 0    [DISCONTINUED] betamethasone dipropionate (DIPROSONE) 0.05 % cream APPLY TO RASH ON LEFT THIGH AND TRUNK TWICE A DAY FOR 2 WEEKS, THEN EVERY OTHER DAY AS NEEDED (Patient not taking: Reported on 1/14/2025)       No current facility-administered medications on file prior to visit.       Past Medical History:   Diagnosis Date    Carpal tunnel syndrome     COVID-19 08/2022    GERD (gastroesophageal reflux disease)     Insomnia     Lichen planus     Low serum IgA for age 04/16/2021       Past Surgical History:   Procedure Laterality Date    COLONOSCOPY N/A 9/20/2021    Procedure: COLONOSCOPY with polypectomy;  Surgeon: Tee Bassett MD;  [place] [facility] [place]  Service: Gastroenterology;  Laterality: N/A;  polyp, diverticulosis    ENDOSCOPY      KNEE SURGERY Right 2000    ACL    TONSILLECTOMY      UMBILICAL HERNIA REPAIR         Family History   Problem Relation Age of Onset    Hyperlipidemia Mother     Hypertension Mother     Diabetes Mother     Alzheimer's disease Mother     COPD Father     Heart disease Father     Hyperlipidemia Father     Hypertension Father     Liver disease Sister         fatty liver       Social History     Socioeconomic History    Marital status: Single   Tobacco Use    Smoking status: Never    Smokeless tobacco: Never   Vaping Use    Vaping status: Never Used   Substance and Sexual Activity    Alcohol use: Yes     Comment: 2 drinks daily    Drug use: No    Sexual activity: Yes     Partners: Male     Birth control/protection: Condom     Comment: he uses condoms           The following  "portions of the patient's history were reviewed and updated as appropriate: problem list, allergies, current medications, past medical history, past family history, past social history, and past surgical history.    Review of Systems    Immunization History   Administered Date(s) Administered    COVID-19 (PFIZER) Purple Cap Monovalent 02/28/2021, 03/21/2021, 10/01/2021    Fluzone  >6mos 10/10/2024    Fluzone (or Fluarix & Flulaval for VFC) >6mos 09/24/2020, 10/15/2021, 12/06/2022, 10/03/2023    Fluzone Quad >6mos (Multi-dose) 09/16/2016, 11/20/2018    Hepatitis A 01/01/2011    Hepatitis B Adult/Adolescent IM 01/01/2011    Influenza, Unspecified 12/06/2022    MMR 06/10/1994    Pneumococcal Polysaccharide (PPSV23) 09/24/2020    Tdap 01/01/2009, 06/10/2019    Typhoid, Unspecified 01/01/2016       Objective   Vitals:    01/14/25 0801   BP: 130/74   Temp: 97.1 °F (36.2 °C)   Weight: 98.4 kg (217 lb)   Height: 182.9 cm (72.01\")     Body mass index is 29.42 kg/m².  Physical Exam  Vitals reviewed.   Constitutional:       Appearance: He is well-developed.   HENT:      Head: Normocephalic and atraumatic.      Mouth/Throat:      Mouth: Mucous membranes are moist.      Pharynx: Oropharynx is clear.   Eyes:      Extraocular Movements: Extraocular movements intact.      Conjunctiva/sclera: Conjunctivae normal.      Pupils: Pupils are equal, round, and reactive to light.   Neck:      Thyroid: No thyromegaly.      Vascular: No carotid bruit.   Cardiovascular:      Rate and Rhythm: Normal rate and regular rhythm.      Heart sounds: Normal heart sounds. No murmur heard.  Pulmonary:      Effort: Pulmonary effort is normal.      Breath sounds: Normal breath sounds.   Abdominal:      General: There is no distension.      Palpations: Abdomen is soft. There is no mass.      Tenderness: There is no abdominal tenderness. There is no rebound.   Musculoskeletal:      Cervical back: Neck supple.   Feet:      Right foot:      Protective " Sensation: 5 sites tested.  5 sites sensed.      Skin integrity: Skin integrity normal.      Left foot:      Protective Sensation: 5 sites tested.  5 sites sensed.      Skin integrity: Skin integrity normal.   Lymphadenopathy:      Cervical: No cervical adenopathy.   Skin:     General: Skin is warm.   Neurological:      Mental Status: He is alert.   Psychiatric:         Behavior: Behavior normal.         Procedures    Assessment & Plan   Diagnoses and all orders for this visit:    1. Wellness examination (Primary)    2. Type 2 diabetes mellitus without complication, without long-term current use of insulin  Comments:  Using CGM and it has helped guide his diet  Orders:  -     linagliptin (Tradjenta) 5 MG tablet tablet; Take 1 tablet by mouth Daily.  Dispense: 30 tablet; Refill: 11  -     empagliflozin (Jardiance) 10 MG tablet tablet; Take 1 tablet by mouth Daily.  Dispense: 30 tablet; Refill: 4  -     Comprehensive Metabolic Panel; Future  -     Hemoglobin A1c; Future  -     Microalbumin / Creatinine Urine Ratio - Urine, Clean Catch; Future  -     Vitamin B12; Future    3. Essential hypertension    4. High cholesterol  -     Lipid Panel With / Chol / HDL Ratio; Future    5. Fatty liver  Comments:  [facility] wt loss and avoid etoh  Orders:  -     CBC & Differential; Future    6. Tubular adenoma  Comments:  Repeat cscope next year    7. Mild intermittent asthma without complication  Comments:  Rec use symbicort daily    8. Encounter for screening for coronary artery disease  -     CT Cardiac Calcium Score Without Dye; Future               Reviewed cmp and A1c. Discussed compliance with symbicort. [facility] yearly eye exam and exercise 150 minutes per week. Flexeril for his low back pain (he had prior RX). I answered his questions about coronary calcium score and how it would not likely be normal at age 49 with DM, HTN and hyperlipidemia; he would like to pursue at .     Return in about 6 months (around 7/14/2025), or 30  minutes, for Lab Before FUP.

## 2025-01-15 ENCOUNTER — TELEPHONE (OUTPATIENT)
Dept: INTERNAL MEDICINE | Facility: CLINIC | Age: 50
End: 2025-01-15
Payer: COMMERCIAL

## 2025-01-15 DIAGNOSIS — E11.9 TYPE 2 DIABETES MELLITUS WITHOUT COMPLICATION, WITHOUT LONG-TERM CURRENT USE OF INSULIN: Chronic | ICD-10-CM

## 2025-01-15 DIAGNOSIS — I10 ESSENTIAL HYPERTENSION: Chronic | ICD-10-CM

## 2025-01-15 DIAGNOSIS — E78.49 OTHER HYPERLIPIDEMIA: ICD-10-CM

## 2025-01-15 DIAGNOSIS — R10.13 DYSPEPSIA: ICD-10-CM

## 2025-01-15 DIAGNOSIS — J45.20 MILD INTERMITTENT ASTHMA WITHOUT COMPLICATION: ICD-10-CM

## 2025-01-15 RX ORDER — AMLODIPINE BESYLATE 10 MG/1
10 TABLET ORAL DAILY
Qty: 90 TABLET | Refills: 3 | Status: SHIPPED | OUTPATIENT
Start: 2025-01-15

## 2025-01-15 RX ORDER — ATORVASTATIN CALCIUM 20 MG/1
20 TABLET, FILM COATED ORAL DAILY
Qty: 90 TABLET | Refills: 3 | Status: SHIPPED | OUTPATIENT
Start: 2025-01-15

## 2025-01-15 RX ORDER — HYDROCHLOROTHIAZIDE 12.5 MG/1
12.5 TABLET ORAL DAILY
Qty: 90 TABLET | Refills: 3 | Status: SHIPPED | OUTPATIENT
Start: 2025-01-15

## 2025-01-15 RX ORDER — BUDESONIDE AND FORMOTEROL FUMARATE DIHYDRATE 80; 4.5 UG/1; UG/1
2 AEROSOL RESPIRATORY (INHALATION) 2 TIMES DAILY
Qty: 10.2 EACH | Refills: 3 | Status: SHIPPED | OUTPATIENT
Start: 2025-01-15

## 2025-01-15 RX ORDER — LISINOPRIL 40 MG/1
40 TABLET ORAL DAILY
Qty: 90 TABLET | Refills: 3 | Status: SHIPPED | OUTPATIENT
Start: 2025-01-15

## 2025-01-15 NOTE — TELEPHONE ENCOUNTER
Caller: Kaushal Jacobs    Relationship: Self    Best call back number: [phone]     Requested Prescriptions:   Requested Prescriptions     Pending Prescriptions Disp Refills    metFORMIN (GLUCOPHAGE) 1000 MG tablet 180 tablet 3     Sig: Take 1 tablet by mouth 2 (Two) Times a Day.    amLODIPine (NORVASC) 10 MG tablet 90 tablet 3     Sig: Take 1 tablet by mouth Daily.    atorvastatin (LIPITOR) 20 MG tablet 90 tablet 3     Sig: Take 1 tablet by mouth Daily.    lisinopril (PRINIVIL,ZESTRIL) 40 MG tablet 90 tablet 3     Sig: Take 1 tablet by mouth Daily.    omeprazole (priLOSEC) 20 MG capsule 90 capsule 3     Sig: Take 1 capsule by mouth Daily.    hydroCHLOROthiazide 12.5 MG tablet 90 tablet 3     Sig: Take 1 tablet by mouth Daily.    linagliptin (Tradjenta) 5 MG tablet tablet 30 tablet 11     Sig: Take 1 tablet by mouth Daily.    empagliflozin (Jardiance) 10 MG tablet tablet 30 tablet 4     Sig: Take 1 tablet by mouth Daily.    budesonide-formoterol (Symbicort) 80-4.5 MCG/ACT inhaler 10.2 each 3     Sig: Inhale 2 puffs 2 (Two) Times a Day.        Pharmacy where request should be sent: [address] [phone] [place] [phone] FX     Last office visit with prescribing clinician: 1/14/2025   Last telemedicine visit with prescribing clinician: Visit date not found   Next office visit with prescribing clinician: 7/14/2025     Additional details provided by patient: NEEDS 90 DAY SUPPLY AND HAS NEW MAIL ORDER PHARMACY TO USE NEEDS NEW PRESCRIPTIONS SENT PLEASE     Does the patient have less than a 3 day supply:  [] Yes  [x] No    Would you like a call back once the refill request has been completed: [] Yes [x] No    If the office needs to give you a call back, can they leave a voicemail: [] Yes [x] No    Sveta Varela Rep   01/15/25 10:04 EST

## 2025-03-25 DIAGNOSIS — J45.901 EXACERBATION OF ASTHMA, UNSPECIFIED ASTHMA SEVERITY, UNSPECIFIED WHETHER PERSISTENT: ICD-10-CM

## 2025-03-25 DIAGNOSIS — F51.01 PRIMARY INSOMNIA: ICD-10-CM

## 2025-03-25 RX ORDER — ZOLPIDEM TARTRATE 10 MG/1
10 TABLET ORAL NIGHTLY PRN
Qty: 30 TABLET | Refills: 0 | Status: SHIPPED | OUTPATIENT
Start: 2025-03-25

## 2025-03-25 RX ORDER — ALBUTEROL SULFATE 90 UG/1
2 INHALANT RESPIRATORY (INHALATION) EVERY 4 HOURS PRN
Qty: 6.7 G | Refills: 0 | Status: SHIPPED | OUTPATIENT
Start: 2025-03-25

## 2025-03-25 NOTE — TELEPHONE ENCOUNTER
Caller: Kaushal Jacobs    Relationship: Self    Best call back number: [phone]     Requested Prescriptions:   Requested Prescriptions     Pending Prescriptions Disp Refills    albuterol sulfate  (90 Base) MCG/ACT inhaler 6.7 g 0     Sig: Inhale 2 puffs Every 4 (Four) Hours As Needed for Wheezing.    zolpidem (AMBIEN) 10 MG tablet 30 tablet 0     Sig: Take 1 tablet by mouth At Night As Needed for Sleep.    -[facility]    Pharmacy where request should be sent: [facility] #[id number] - [place], [place] - [address] [place] [address]. AT NEAR [facility] - [phone] [facility] [phone] FX     Last office visit with prescribing clinician: 1/14/2025   Last telemedicine visit with prescribing clinician: Visit date not found   Next office visit with prescribing clinician: 7/14/2025     Additional details provided by patient:     Does the patient have less than a 3 day supply:  [x] Yes  [] No    Would you like a call back once the refill request has been completed: [] Yes [x] No    If the office needs to give you a call back, can they leave a voicemail: [] Yes [x] No    Sveta Bragg Rep   03/25/25 12:21 EDT

## 2025-03-26 ENCOUNTER — TELEPHONE (OUTPATIENT)
Dept: INTERNAL MEDICINE | Facility: CLINIC | Age: 50
End: 2025-03-26
Payer: COMMERCIAL

## 2025-03-26 NOTE — TELEPHONE ENCOUNTER
Caller: Kaushal Jacobs    Relationship: Self    Best call back number: [phone]     What medication are you requesting: VALIUM FOR INTERNATIONAL FLIGHT TO [facility]3]     If a prescription is needed, what is your preferred pharmacy and phone number: [id number] - [place], [place] - [address]. AT NEAR [facility] - [phone] [facility] [phone] FX     Additional notes:    PLEASE NOTIFY PATIENT WHEN DONE PATIENT LEAVES ON 4/3/25

## 2025-03-27 DIAGNOSIS — Z78.9 HX OF FOREIGN TRAVEL: Primary | ICD-10-CM

## 2025-03-27 RX ORDER — DIAZEPAM 5 MG/1
TABLET ORAL
Qty: 4 TABLET | Refills: 0 | Status: SHIPPED | OUTPATIENT
Start: 2025-03-27

## 2025-05-29 ENCOUNTER — TELEPHONE (OUTPATIENT)
Dept: INTERNAL MEDICINE | Facility: CLINIC | Age: 50
End: 2025-05-29
Payer: COMMERCIAL

## 2025-05-29 ENCOUNTER — OFFICE VISIT (OUTPATIENT)
Dept: INTERNAL MEDICINE | Facility: CLINIC | Age: 50
End: 2025-05-29
Payer: COMMERCIAL

## 2025-05-29 VITALS
SYSTOLIC BLOOD PRESSURE: 160 MMHG | WEIGHT: 214.6 LBS | HEIGHT: 72 IN | DIASTOLIC BLOOD PRESSURE: 80 MMHG | BODY MASS INDEX: 29.07 KG/M2

## 2025-05-29 DIAGNOSIS — Z20.2 STD EXPOSURE: Primary | ICD-10-CM

## 2025-05-29 PROCEDURE — 99213 OFFICE O/P EST LOW 20 MIN: CPT | Performed by: INTERNAL MEDICINE

## 2025-05-29 RX ORDER — EMTRICITABINE AND TENOFOVIR DISOPROXIL FUMARATE 200; 300 MG/1; MG/1
1 TABLET, FILM COATED ORAL DAILY
Qty: 28 TABLET | Refills: 0 | Status: SHIPPED | OUTPATIENT
Start: 2025-05-29

## 2025-05-29 NOTE — TELEPHONE ENCOUNTER
Patient states he had oral sex Tuesday 5/27/25 with partner (has found out partner is HIV positive), he has some concerns and wants to be seen ASAP (states there may be some medication to take), please advise [phone]

## 2025-05-29 NOTE — PROGRESS NOTES
Subjective        Chief Complaint   Patient presents with    Exposure to STD           Kaushal Jacobs is a 49 y.o. male who presents for    Patient Active Problem List   Diagnosis    Essential hypertension    High cholesterol    Asthma    Type 2 diabetes mellitus without complication, without long-term current use of insulin    Dyspepsia    Fatty liver    Primary insomnia    Tubular adenoma    ED (erectile dysfunction)       History of Present Illness    He performed oral sex on a man on Tuesday who is HIV positive who is taking medications that has an undetectable viral load. He did not ejaculate in his mouth.  He did not have any other type of sexual encounter.    He does use condoms with anal sex. He does not receive. He does not use condoms with oral sex that may occur once per month.     Allergies   Allergen Reactions    Penicillins        Current Outpatient Medications on File Prior to Visit   Medication Sig Dispense Refill    albuterol sulfate  (90 Base) MCG/ACT inhaler Inhale 2 puffs Every 4 (Four) Hours As Needed for Wheezing. 6.7 g 0    amLODIPine (NORVASC) 10 MG tablet Take 1 tablet by mouth Daily. 90 tablet 3    atorvastatin (LIPITOR) 20 MG tablet Take 1 tablet by mouth Daily. 90 tablet 3    Blood Glucose Monitoring Suppl w/Device kit Use 1 Device Daily. 100 each 3    budesonide-formoterol (Symbicort) 80-4.5 MCG/ACT inhaler Inhale 2 puffs 2 (Two) Times a Day. 10.2 each 3    Continuous Blood Gluc Sensor (FreeStyle Klever 14 Day Sensor) misc 1 each Every 14 (Fourteen) Days. 2 each 3    Continuous Glucose Sensor (FreeStyle Klever 3 Sensor) misc Use 1 Device Continuous. 3 each 5    diazePAM (Valium) 5 MG tablet Take one tab po 30 minutes before international flight 4 tablet 0    empagliflozin (Jardiance) 10 MG tablet tablet Take 1 tablet by mouth Daily. 90 tablet 3    hydroCHLOROthiazide 12.5 MG tablet Take 1 tablet by mouth Daily. 90 tablet 3    linagliptin (Tradjenta) 5 MG tablet tablet Take 1 tablet  by mouth Daily. 30 tablet 11    lisinopril (PRINIVIL,ZESTRIL) 40 MG tablet Take 1 tablet by mouth Daily. 90 tablet 3    metFORMIN (GLUCOPHAGE) 1000 MG tablet Take 1 tablet by mouth 2 (Two) Times a Day. 180 tablet 3    omeprazole (priLOSEC) 20 MG capsule Take 1 capsule by mouth Daily. 90 capsule 3    sildenafil (REVATIO) 20 MG tablet Take 2-3 tabs po 30 minutes before intercourse prn 90 tablet 1    zolpidem (AMBIEN) 10 MG tablet Take 1 tablet by mouth At Night As Needed for Sleep. 30 tablet 0     No current facility-administered medications on file prior to visit.       Past Medical History:   Diagnosis Date    Carpal tunnel syndrome     COVID-19 08/2022    GERD (gastroesophageal reflux disease)     Insomnia     Lichen planus     Low serum IgA for age 04/16/2021       Past Surgical History:   Procedure Laterality Date    COLONOSCOPY N/A 9/20/2021    Procedure: COLONOSCOPY with polypectomy;  Surgeon: Tee Bassett MD;  Location: [facility] MAIN OR;  Service: Gastroenterology;  Laterality: N/A;  polyp, diverticulosis    ENDOSCOPY      KNEE SURGERY Right 2000    ACL    TONSILLECTOMY      UMBILICAL HERNIA REPAIR         Family History   Problem Relation Age of Onset    Hyperlipidemia Mother     Hypertension Mother     Diabetes Mother     Alzheimer's disease Mother     COPD Father     Heart disease Father     Hyperlipidemia Father     Hypertension Father     Liver disease Sister         fatty liver       Social History     Socioeconomic History    Marital status: Single   Tobacco Use    Smoking status: Never    Smokeless tobacco: Never   Vaping Use    Vaping status: Never Used   Substance and Sexual Activity    Alcohol use: Yes     Comment: 2 drinks daily    Drug use: No    Sexual activity: Yes     Partners: Male     Birth control/protection: Condom     Comment: he uses condoms           The following portions of the patient's history were reviewed and updated as appropriate: problem list, allergies, current  "medications, past medical history, past family history, past social history, and past surgical history.    Review of Systems    Immunization History   Administered Date(s) Administered    COVID-19 (PFIZER) Purple Cap Monovalent 02/28/2021, 03/21/2021, 10/01/2021    Fluzone  >6mos 10/10/2024    Fluzone (or Fluarix & Flulaval for VFC) >6mos 09/24/2020, 10/15/2021, 12/06/2022, 10/03/2023    Fluzone Quad >6mos (Multi-dose) 09/16/2016, 11/20/2018    Hepatitis A 01/01/2011    Hepatitis B Adult/Adolescent IM 01/01/2011    Influenza, Unspecified 12/06/2022    MMR 06/10/1994    Pneumococcal Polysaccharide (PPSV23) 09/24/2020    Tdap 01/01/2009, 06/10/2019    Typhoid, Unspecified 01/01/2016       Objective   Vitals:    05/29/25 1602   BP: 160/80   Weight: 97.3 kg (214 lb 9.6 oz)   Height: 182.9 cm (72.01\")     Body mass index is 29.1 kg/m².  Physical Exam  Vitals reviewed.   Constitutional:       Appearance: He is well-developed.   HENT:      Head: Normocephalic and atraumatic.   Cardiovascular:      Rate and Rhythm: Normal rate and regular rhythm.      Heart sounds: Normal heart sounds, S1 normal and S2 normal.   Pulmonary:      Effort: Pulmonary effort is normal.      Breath sounds: Normal breath sounds.   Skin:     General: Skin is warm.   Neurological:      Mental Status: He is alert.   Psychiatric:         Behavior: Behavior normal.         Procedures    Assessment & Plan   Diagnoses and all orders for this visit:    1. STD exposure (Primary)  -     HIV-1 / O / 2 Ag / Antibody  -     RPR Qualitative with Reflex to Quant  -     Hepatitis B Surface Antigen  -     Hepatitis B Surface Antibody  -     Hepatitis B Core Antibody, Total  -     emtricitabine-tenofovir (Truvada) 200-300 MG per tablet; Take 1 tablet by mouth Daily.  Dispense: 28 tablet; Refill: 0  -     raltegravir (ISENTRESS) 400 MG tablet; Take 1 tablet by mouth 2 (Two) Times a Day.  Dispense: 56 tablet; Refill: 0      PEP therapy. Discussed using condoms at all " times and consider Prep therapy; he will think about.             Return for Lab Today.

## 2025-05-30 LAB
HBV CORE AB SERPL QL IA: NEGATIVE
HBV SURFACE AB SER QL: REACTIVE
HBV SURFACE AG SERPL QL IA: NEGATIVE
HIV 1+2 AB+HIV1 P24 AG SERPL QL IA: NON REACTIVE
RPR SER QL: NON REACTIVE

## 2025-06-30 DIAGNOSIS — E11.9 TYPE 2 DIABETES MELLITUS WITHOUT COMPLICATION, WITHOUT LONG-TERM CURRENT USE OF INSULIN: Chronic | ICD-10-CM

## 2025-06-30 RX ORDER — HYDROCHLOROTHIAZIDE 12.5 MG/1
CAPSULE ORAL
Qty: 3 EACH | Refills: 5 | Status: SHIPPED | OUTPATIENT
Start: 2025-06-30

## 2025-07-02 ENCOUNTER — HOSPITAL ENCOUNTER (OUTPATIENT)
Facility: HOSPITAL | Age: 50
Discharge: HOME | End: 2025-07-02
Admitting: INTERNAL MEDICINE

## 2025-07-02 DIAGNOSIS — Z13.6 ENCOUNTER FOR SCREENING FOR CORONARY ARTERY DISEASE: ICD-10-CM

## 2025-07-02 PROCEDURE — 75571 CT HRT W/O DYE W/CA TEST: CPT

## 2025-07-14 ENCOUNTER — OFFICE VISIT (OUTPATIENT)
Dept: INTERNAL MEDICINE | Facility: CLINIC | Age: 50
End: 2025-07-14
Payer: COMMERCIAL

## 2025-07-14 VITALS
DIASTOLIC BLOOD PRESSURE: 80 MMHG | SYSTOLIC BLOOD PRESSURE: 140 MMHG | WEIGHT: 215.8 LBS | HEIGHT: 72 IN | BODY MASS INDEX: 29.23 KG/M2

## 2025-07-14 DIAGNOSIS — E53.8 B12 DEFICIENCY: ICD-10-CM

## 2025-07-14 DIAGNOSIS — K76.0 FATTY LIVER: ICD-10-CM

## 2025-07-14 DIAGNOSIS — E78.00 HIGH CHOLESTEROL: ICD-10-CM

## 2025-07-14 DIAGNOSIS — I10 ESSENTIAL HYPERTENSION: Primary | Chronic | ICD-10-CM

## 2025-07-14 DIAGNOSIS — E11.9 TYPE 2 DIABETES MELLITUS WITHOUT COMPLICATION, WITHOUT LONG-TERM CURRENT USE OF INSULIN: Chronic | ICD-10-CM

## 2025-07-14 PROCEDURE — 99214 OFFICE O/P EST MOD 30 MIN: CPT | Performed by: INTERNAL MEDICINE

## 2025-07-14 RX ORDER — LANOLIN ALCOHOL/MO/W.PET/CERES
1000 CREAM (GRAM) TOPICAL DAILY
Qty: 90 TABLET | Refills: 2 | Status: SHIPPED | OUTPATIENT
Start: 2025-07-14 | End: 2025-07-14 | Stop reason: SDUPTHER

## 2025-07-14 RX ORDER — HYDROCHLOROTHIAZIDE 25 MG/1
25 TABLET ORAL DAILY
Qty: 90 TABLET | Refills: 1 | Status: SHIPPED | OUTPATIENT
Start: 2025-07-14

## 2025-07-14 RX ORDER — LANOLIN ALCOHOL/MO/W.PET/CERES
1000 CREAM (GRAM) TOPICAL DAILY
Qty: 90 TABLET | Refills: 2 | Status: SHIPPED | OUTPATIENT
Start: 2025-07-14

## 2025-07-14 NOTE — PROGRESS NOTES
Subjective        Chief Complaint   Patient presents with    Diabetes           Kaushal Jacobs is a 49 y.o. male who presents for    Patient Active Problem List   Diagnosis    Essential hypertension    High cholesterol    Asthma    Type 2 diabetes mellitus without complication, without long-term current use of insulin    Dyspepsia    Fatty liver    Primary insomnia    ED (erectile dysfunction)    B12 deficiency       History of Present Illness       Denies chest pain, dyspnea, nausea or abdominal pain. He has not been checking his BP. His sugars have been in range 96 percent of the time. He finished his post exposure prophylaxis. He has two glasses of wine per day.   Allergies   Allergen Reactions    Penicillins        Current Outpatient Medications on File Prior to Visit   Medication Sig Dispense Refill    albuterol sulfate  (90 Base) MCG/ACT inhaler Inhale 2 puffs Every 4 (Four) Hours As Needed for Wheezing. 6.7 g 0    amLODIPine (NORVASC) 10 MG tablet Take 1 tablet by mouth Daily. 90 tablet 3    atorvastatin (LIPITOR) 20 MG tablet Take 1 tablet by mouth Daily. 90 tablet 3    Blood Glucose Monitoring Suppl w/Device kit Use 1 Device Daily. 100 each 3    budesonide-formoterol (Symbicort) 80-4.5 MCG/ACT inhaler Inhale 2 puffs 2 (Two) Times a Day. 10.2 each 3    Continuous Blood Gluc Sensor (FreeStyle Klever 14 Day Sensor) misc 1 each Every 14 (Fourteen) Days. 2 each 3    Continuous Glucose Sensor (FreeStyle Klever 3 Plus Sensor) USE 1 DEVICE CONTINUOUS 3 each 5    diazePAM (Valium) 5 MG tablet Take one tab po 30 minutes before international flight 4 tablet 0    empagliflozin (Jardiance) 10 MG tablet tablet Take 1 tablet by mouth Daily. 90 tablet 3    linagliptin (Tradjenta) 5 MG tablet tablet Take 1 tablet by mouth Daily. 30 tablet 11    lisinopril (PRINIVIL,ZESTRIL) 40 MG tablet Take 1 tablet by mouth Daily. 90 tablet 3    metFORMIN (GLUCOPHAGE) 1000 MG tablet Take 1 tablet by mouth 2 (Two) Times a Day. 180  tablet 3    omeprazole (priLOSEC) 20 MG capsule Take 1 capsule by mouth Daily. 90 capsule 3    sildenafil (REVATIO) 20 MG tablet Take 2-3 tabs po 30 minutes before intercourse prn 90 tablet 1    zolpidem (AMBIEN) 10 MG tablet Take 1 tablet by mouth At Night As Needed for Sleep. 30 tablet 0    [DISCONTINUED] emtricitabine-tenofovir (Truvada) 200-300 MG per tablet Take 1 tablet by mouth Daily. 28 tablet 0    [DISCONTINUED] hydroCHLOROthiazide 12.5 MG tablet Take 1 tablet by mouth Daily. 90 tablet 3    [DISCONTINUED] raltegravir (ISENTRESS) 400 MG tablet Take 1 tablet by mouth 2 (Two) Times a Day. 56 tablet 0     No current facility-administered medications on file prior to visit.       Past Medical History:   Diagnosis Date    Carpal tunnel syndrome     COVID-19 08/2022    GERD (gastroesophageal reflux disease)     Insomnia     Lichen planus     Low serum IgA for age 04/16/2021    Tubular adenoma 09/2021       Past Surgical History:   Procedure Laterality Date    COLONOSCOPY N/A 9/20/2021    Procedure: COLONOSCOPY with polypectomy;  Surgeon: Tee Bassett MD;  Location: [facility] MAIN OR;  Service: Gastroenterology;  Laterality: N/A;  polyp, diverticulosis    ENDOSCOPY      KNEE SURGERY Right 2000    ACL    TONSILLECTOMY      UMBILICAL HERNIA REPAIR         Family History   Problem Relation Age of Onset    Hyperlipidemia Mother     Hypertension Mother     Diabetes Mother     Alzheimer's disease Mother     COPD Father     Heart disease Father     Hyperlipidemia Father     Hypertension Father     Liver disease Sister         fatty liver       Social History     Socioeconomic History    Marital status: Single   Tobacco Use    Smoking status: Never    Smokeless tobacco: Never   Vaping Use    Vaping status: Never Used   Substance and Sexual Activity    Alcohol use: Yes     Comment: 2 drinks daily    Drug use: No    Sexual activity: Yes     Partners: Male     Birth control/protection: Condom     Comment: he uses condoms  "          The following portions of the patient's history were reviewed and updated as appropriate: problem list, allergies, current medications, past medical history, past family history, past social history, and past surgical history.    Review of Systems    Immunization History   Administered Date(s) Administered    COVID-19 (PFIZER) Purple Cap Monovalent 02/28/2021, 03/21/2021, 10/01/2021    Fluzone  >6mos 10/10/2024    Fluzone (or Fluarix & Flulaval for VFC) >6mos 09/24/2020, 10/15/2021, 12/06/2022, 10/03/2023    Fluzone Quad >6mos (Multi-dose) 09/16/2016, 11/20/2018    Hepatitis A 01/01/2011    Hepatitis B Adult/Adolescent IM 01/01/2011    Influenza, Unspecified 12/06/2022    [place] Encephalitis IM 01/01/2016, 01/06/2016, 02/03/2016    MMR 06/10/1994    Pneumococcal Polysaccharide (PPSV23) 09/24/2020    Tdap 01/01/2009, 06/10/2019, 05/03/2024    Typhoid Inactivated 05/03/2024    Typhoid, Unspecified 01/01/2016       Objective   Vitals:    07/14/25 0757   BP: 140/80   Weight: 97.9 kg (215 lb 12.8 oz)   Height: 182.9 cm (72.01\")     Body mass index is 29.26 kg/m².  Physical Exam  Vitals reviewed.   Constitutional:       Appearance: He is well-developed.   HENT:      Head: Normocephalic and atraumatic.   Cardiovascular:      Rate and Rhythm: Normal rate and regular rhythm.      Heart sounds: Normal heart sounds, S1 normal and S2 normal.   Pulmonary:      Effort: Pulmonary effort is normal.      Breath sounds: Normal breath sounds.   Skin:     General: Skin is warm.   Neurological:      Mental Status: He is alert.   Psychiatric:         Behavior: Behavior normal.         Procedures    Assessment & Plan   Diagnoses and all orders for this visit:    1. Essential hypertension (Primary)  -     hydroCHLOROthiazide 25 MG tablet; Take 1 tablet by mouth Daily.  Dispense: 90 tablet; Refill: 1  -     Basic Metabolic Panel; Future    2. High cholesterol  Comments:  LDL at goal    3. Type 2 diabetes mellitus without " complication, without long-term current use of insulin  Comments:  Sugars at goal    4. Fatty liver  Comments:  Recommend wt loss and avoid etoh    5. B12 deficiency  Comments:  start B12 and recheck in the fall  Orders:  -     vitamin B-12 (CYANOCOBALAMIN) 1000 MCG tablet; Take 1 tablet by mouth Daily.  Dispense: 90 tablet; Refill: 2                 Reviewed coronary CT, cmp, flp, B12, and A1c. Discussed prep therapy; he is thinking about. I will recheck HIV in the fall. Increase HCTZ.  Discussed exercising 150-300 min per week.   Return in about 4 weeks (around 8/11/2025), or 30 minutes.

## 2025-08-07 DIAGNOSIS — F51.01 PRIMARY INSOMNIA: ICD-10-CM

## 2025-08-07 RX ORDER — ZOLPIDEM TARTRATE 10 MG/1
10 TABLET ORAL NIGHTLY PRN
Qty: 30 TABLET | Refills: 1 | Status: SHIPPED | OUTPATIENT
Start: 2025-08-07

## 2025-08-14 ENCOUNTER — OFFICE VISIT (OUTPATIENT)
Dept: INTERNAL MEDICINE | Facility: CLINIC | Age: 50
End: 2025-08-14
Payer: COMMERCIAL

## 2025-08-14 VITALS
BODY MASS INDEX: 29.31 KG/M2 | DIASTOLIC BLOOD PRESSURE: 70 MMHG | WEIGHT: 216.4 LBS | SYSTOLIC BLOOD PRESSURE: 132 MMHG | HEIGHT: 72 IN

## 2025-08-14 DIAGNOSIS — I10 ESSENTIAL HYPERTENSION: Chronic | ICD-10-CM

## 2025-08-14 DIAGNOSIS — E11.9 TYPE 2 DIABETES MELLITUS WITHOUT COMPLICATION, WITHOUT LONG-TERM CURRENT USE OF INSULIN: ICD-10-CM

## 2025-08-14 DIAGNOSIS — Z20.2 POSSIBLE EXPOSURE TO SEXUALLY TRANSMITTED INFECTION: ICD-10-CM

## 2025-08-14 DIAGNOSIS — Z78.9 HX OF FOREIGN TRAVEL: ICD-10-CM

## 2025-08-14 DIAGNOSIS — E53.8 B12 DEFICIENCY: Primary | ICD-10-CM

## 2025-08-14 RX ORDER — DIAZEPAM 5 MG/1
TABLET ORAL
Qty: 4 TABLET | Refills: 0 | Status: SHIPPED | OUTPATIENT
Start: 2025-08-14

## (undated) DEVICE — Device

## (undated) DEVICE — VIAL FORMLN CAP 10PCT 20ML

## (undated) DEVICE — KT ORCA ORCAPOD DISP STRL

## (undated) DEVICE — GOWN ,SIRUS,NONREINFORCED 3XL: Brand: MEDLINE

## (undated) DEVICE — FLEX ADVANTAGE 1500CC: Brand: FLEX ADVANTAGE

## (undated) DEVICE — SINGLE-USE BIOPSY FORCEPS: Brand: RADIAL JAW 4

## (undated) DEVICE — GOWN ISOL W/THUMB UNIV BLU BX/15

## (undated) DEVICE — CANN NASL CO2 TRULINK W/O2 A/